# Patient Record
Sex: FEMALE | Race: WHITE | URBAN - METROPOLITAN AREA
[De-identification: names, ages, dates, MRNs, and addresses within clinical notes are randomized per-mention and may not be internally consistent; named-entity substitution may affect disease eponyms.]

---

## 2017-02-21 ENCOUNTER — TELEPHONE (OUTPATIENT)
Dept: INTERNAL MEDICINE CLINIC | Facility: CLINIC | Age: 72
End: 2017-02-21

## 2017-05-16 ENCOUNTER — PRIOR ORIGINAL RECORDS (OUTPATIENT)
Dept: OTHER | Age: 72
End: 2017-05-16

## 2017-06-13 ENCOUNTER — IMAGING SERVICES (OUTPATIENT)
Dept: OTHER | Age: 72
End: 2017-06-13

## 2017-06-13 ENCOUNTER — HOSPITAL (OUTPATIENT)
Dept: OTHER | Age: 72
End: 2017-06-13
Attending: OBSTETRICS & GYNECOLOGY

## 2017-06-20 ENCOUNTER — MYAURORA ACCOUNT LINK (OUTPATIENT)
Dept: OTHER | Age: 72
End: 2017-06-20

## 2017-06-20 ENCOUNTER — PRIOR ORIGINAL RECORDS (OUTPATIENT)
Dept: OTHER | Age: 72
End: 2017-06-20

## 2018-01-15 ENCOUNTER — ANTI-COAG VISIT (OUTPATIENT)
Dept: INTERNAL MEDICINE CLINIC | Facility: CLINIC | Age: 73
End: 2018-01-15

## 2018-01-15 DIAGNOSIS — I87.2 VENOUS (PERIPHERAL) INSUFFICIENCY: Primary | ICD-10-CM

## 2018-09-04 PROCEDURE — 87086 URINE CULTURE/COLONY COUNT: CPT | Performed by: EMERGENCY MEDICINE

## 2019-02-28 VITALS
WEIGHT: 150 LBS | BODY MASS INDEX: 22.73 KG/M2 | HEART RATE: 64 BPM | DIASTOLIC BLOOD PRESSURE: 70 MMHG | SYSTOLIC BLOOD PRESSURE: 132 MMHG | HEIGHT: 68 IN | RESPIRATION RATE: 16 BRPM

## 2019-03-01 VITALS
RESPIRATION RATE: 16 BRPM | DIASTOLIC BLOOD PRESSURE: 60 MMHG | HEART RATE: 80 BPM | WEIGHT: 151 LBS | HEIGHT: 68 IN | BODY MASS INDEX: 22.88 KG/M2 | SYSTOLIC BLOOD PRESSURE: 120 MMHG

## 2020-06-24 PROBLEM — F13.20 BENZODIAZEPINE DEPENDENCE (HCC): Status: ACTIVE | Noted: 2020-06-24

## 2020-06-24 PROBLEM — I70.0 AORTIC ATHEROSCLEROSIS: Status: ACTIVE | Noted: 2020-06-24

## 2020-06-24 PROBLEM — I70.0 AORTIC ATHEROSCLEROSIS (HCC): Status: ACTIVE | Noted: 2020-06-24

## 2020-11-12 PROCEDURE — 88305 TISSUE EXAM BY PATHOLOGIST: CPT | Performed by: RADIOLOGY

## 2021-01-21 PROBLEM — F13.20 BENZODIAZEPINE DEPENDENCE (HCC): Status: RESOLVED | Noted: 2020-06-24 | Resolved: 2021-01-21

## 2023-12-18 ENCOUNTER — HOSPITAL ENCOUNTER (EMERGENCY)
Facility: HOSPITAL | Age: 78
Discharge: HOME | End: 2023-12-18
Attending: EMERGENCY MEDICINE
Payer: MEDICARE

## 2023-12-18 ENCOUNTER — APPOINTMENT (OUTPATIENT)
Dept: ULTRASOUND IMAGING | Facility: HOSPITAL | Age: 78
End: 2023-12-18
Attending: EMERGENCY MEDICINE
Payer: MEDICARE

## 2023-12-18 VITALS
OXYGEN SATURATION: 100 % | HEIGHT: 66.5 IN | RESPIRATION RATE: 20 BRPM | WEIGHT: 150 LBS | BODY MASS INDEX: 23.82 KG/M2 | TEMPERATURE: 98 F | HEART RATE: 97 BPM | SYSTOLIC BLOOD PRESSURE: 156 MMHG | DIASTOLIC BLOOD PRESSURE: 81 MMHG

## 2023-12-18 DIAGNOSIS — N93.9 ABNORMAL VAGINAL BLEEDING: Primary | ICD-10-CM

## 2023-12-18 LAB
ALBUMIN SERPL-MCNC: 4 G/DL (ref 3.4–5)
ALBUMIN/GLOB SERPL: 1 {RATIO} (ref 1–2)
ALP LIVER SERPL-CCNC: 88 U/L
ALT SERPL-CCNC: 21 U/L
ANION GAP SERPL CALC-SCNC: 4 MMOL/L (ref 0–18)
AST SERPL-CCNC: 17 U/L (ref 15–37)
BASOPHILS # BLD AUTO: 0.04 X10(3) UL (ref 0–0.2)
BASOPHILS NFR BLD AUTO: 0.4 %
BILIRUB SERPL-MCNC: 0.4 MG/DL (ref 0.1–2)
BILIRUB UR QL STRIP.AUTO: NEGATIVE
BUN BLD-MCNC: 31 MG/DL (ref 9–23)
CALCIUM BLD-MCNC: 9.6 MG/DL (ref 8.5–10.1)
CHLORIDE SERPL-SCNC: 107 MMOL/L (ref 98–112)
CLARITY UR REFRACT.AUTO: CLEAR
CO2 SERPL-SCNC: 28 MMOL/L (ref 21–32)
CREAT BLD-MCNC: 0.89 MG/DL
EGFRCR SERPLBLD CKD-EPI 2021: 67 ML/MIN/1.73M2 (ref 60–?)
EOSINOPHIL # BLD AUTO: 0.16 X10(3) UL (ref 0–0.7)
EOSINOPHIL NFR BLD AUTO: 1.7 %
ERYTHROCYTE [DISTWIDTH] IN BLOOD BY AUTOMATED COUNT: 12.5 %
GLOBULIN PLAS-MCNC: 4 G/DL (ref 2.8–4.4)
GLUCOSE BLD-MCNC: 114 MG/DL (ref 70–99)
GLUCOSE UR STRIP.AUTO-MCNC: NORMAL MG/DL
HCT VFR BLD AUTO: 39.2 %
HGB BLD-MCNC: 13.4 G/DL
IMM GRANULOCYTES # BLD AUTO: 0.02 X10(3) UL (ref 0–1)
IMM GRANULOCYTES NFR BLD: 0.2 %
KETONES UR STRIP.AUTO-MCNC: NEGATIVE MG/DL
LEUKOCYTE ESTERASE UR QL STRIP.AUTO: NEGATIVE
LYMPHOCYTES # BLD AUTO: 1.67 X10(3) UL (ref 1–4)
LYMPHOCYTES NFR BLD AUTO: 17.8 %
MCH RBC QN AUTO: 32.1 PG (ref 26–34)
MCHC RBC AUTO-ENTMCNC: 34.2 G/DL (ref 31–37)
MCV RBC AUTO: 93.8 FL
MONOCYTES # BLD AUTO: 1.11 X10(3) UL (ref 0.1–1)
MONOCYTES NFR BLD AUTO: 11.8 %
NEUTROPHILS # BLD AUTO: 6.4 X10 (3) UL (ref 1.5–7.7)
NEUTROPHILS # BLD AUTO: 6.4 X10(3) UL (ref 1.5–7.7)
NEUTROPHILS NFR BLD AUTO: 68.1 %
NITRITE UR QL STRIP.AUTO: NEGATIVE
OSMOLALITY SERPL CALC.SUM OF ELEC: 295 MOSM/KG (ref 275–295)
PH UR STRIP.AUTO: 6.5 [PH] (ref 5–8)
PLATELET # BLD AUTO: 281 10(3)UL (ref 150–450)
POTASSIUM SERPL-SCNC: 3.6 MMOL/L (ref 3.5–5.1)
PROT SERPL-MCNC: 8 G/DL (ref 6.4–8.2)
PROT UR STRIP.AUTO-MCNC: NEGATIVE MG/DL
RBC # BLD AUTO: 4.18 X10(6)UL
RBC #/AREA URNS AUTO: >10 /HPF
SODIUM SERPL-SCNC: 139 MMOL/L (ref 136–145)
SP GR UR STRIP.AUTO: 1.02 (ref 1–1.03)
UROBILINOGEN UR STRIP.AUTO-MCNC: NORMAL MG/DL
WBC # BLD AUTO: 9.4 X10(3) UL (ref 4–11)

## 2023-12-18 PROCEDURE — 36415 COLL VENOUS BLD VENIPUNCTURE: CPT

## 2023-12-18 PROCEDURE — 85025 COMPLETE CBC W/AUTO DIFF WBC: CPT | Performed by: EMERGENCY MEDICINE

## 2023-12-18 PROCEDURE — 99284 EMERGENCY DEPT VISIT MOD MDM: CPT

## 2023-12-18 PROCEDURE — 99285 EMERGENCY DEPT VISIT HI MDM: CPT

## 2023-12-18 PROCEDURE — 81001 URINALYSIS AUTO W/SCOPE: CPT | Performed by: EMERGENCY MEDICINE

## 2023-12-18 PROCEDURE — 76856 US EXAM PELVIC COMPLETE: CPT | Performed by: EMERGENCY MEDICINE

## 2023-12-18 PROCEDURE — 80053 COMPREHEN METABOLIC PANEL: CPT | Performed by: EMERGENCY MEDICINE

## 2023-12-18 PROCEDURE — 93975 VASCULAR STUDY: CPT | Performed by: EMERGENCY MEDICINE

## 2023-12-18 NOTE — ED INITIAL ASSESSMENT (HPI)
Pt to ED for c/o vaginal bleeding sudden onset at 2215. Pt states it started with a brownish  \"gush\", now bright red blood while using the BR. Denies blood thinner use, nor urinary symptoms.

## 2024-01-09 RX ORDER — MULTIVIT-MIN/IRON FUM/FOLIC AC 7.5 MG-4
1 TABLET ORAL DAILY
COMMUNITY

## 2024-01-25 ENCOUNTER — ANESTHESIA EVENT (OUTPATIENT)
Dept: SURGERY | Facility: HOSPITAL | Age: 79
End: 2024-01-25
Payer: MEDICARE

## 2024-02-02 ENCOUNTER — ANESTHESIA (OUTPATIENT)
Dept: SURGERY | Facility: HOSPITAL | Age: 79
End: 2024-02-02
Payer: MEDICARE

## 2024-02-02 ENCOUNTER — HOSPITAL ENCOUNTER (OUTPATIENT)
Facility: HOSPITAL | Age: 79
Setting detail: HOSPITAL OUTPATIENT SURGERY
Discharge: HOME | End: 2024-02-02
Attending: OBSTETRICS & GYNECOLOGY | Admitting: OBSTETRICS & GYNECOLOGY
Payer: MEDICARE

## 2024-02-02 VITALS
TEMPERATURE: 98 F | HEART RATE: 93 BPM | DIASTOLIC BLOOD PRESSURE: 71 MMHG | OXYGEN SATURATION: 99 % | WEIGHT: 149 LBS | HEIGHT: 66.5 IN | RESPIRATION RATE: 16 BRPM | SYSTOLIC BLOOD PRESSURE: 142 MMHG | BODY MASS INDEX: 23.67 KG/M2

## 2024-02-02 PROBLEM — N95.0 POST-MENOPAUSAL BLEEDING: Status: ACTIVE | Noted: 2024-02-02

## 2024-02-02 PROCEDURE — 0UDB8ZX EXTRACTION OF ENDOMETRIUM, VIA NATURAL OR ARTIFICIAL OPENING ENDOSCOPIC, DIAGNOSTIC: ICD-10-PCS | Performed by: OBSTETRICS & GYNECOLOGY

## 2024-02-02 PROCEDURE — 88342 IMHCHEM/IMCYTCHM 1ST ANTB: CPT | Performed by: OBSTETRICS & GYNECOLOGY

## 2024-02-02 PROCEDURE — 88305 TISSUE EXAM BY PATHOLOGIST: CPT | Performed by: OBSTETRICS & GYNECOLOGY

## 2024-02-02 RX ORDER — METOCLOPRAMIDE HYDROCHLORIDE 5 MG/ML
INJECTION INTRAMUSCULAR; INTRAVENOUS AS NEEDED
Status: DISCONTINUED | OUTPATIENT
Start: 2024-02-02 | End: 2024-02-02 | Stop reason: SURG

## 2024-02-02 RX ORDER — NAPROXEN 500 MG/1
500 TABLET ORAL 3 TIMES DAILY
COMMUNITY

## 2024-02-02 RX ORDER — ONDANSETRON 2 MG/ML
4 INJECTION INTRAMUSCULAR; INTRAVENOUS EVERY 6 HOURS PRN
Status: DISCONTINUED | OUTPATIENT
Start: 2024-02-02 | End: 2024-02-02

## 2024-02-02 RX ORDER — HYDROMORPHONE HYDROCHLORIDE 1 MG/ML
0.4 INJECTION, SOLUTION INTRAMUSCULAR; INTRAVENOUS; SUBCUTANEOUS EVERY 5 MIN PRN
Status: DISCONTINUED | OUTPATIENT
Start: 2024-02-02 | End: 2024-02-02

## 2024-02-02 RX ORDER — GLYCOPYRROLATE 0.2 MG/ML
INJECTION, SOLUTION INTRAMUSCULAR; INTRAVENOUS AS NEEDED
Status: DISCONTINUED | OUTPATIENT
Start: 2024-02-02 | End: 2024-02-02 | Stop reason: SURG

## 2024-02-02 RX ORDER — HYDROCODONE BITARTRATE AND ACETAMINOPHEN 5; 325 MG/1; MG/1
2 TABLET ORAL ONCE AS NEEDED
Status: DISCONTINUED | OUTPATIENT
Start: 2024-02-02 | End: 2024-02-02

## 2024-02-02 RX ORDER — NALOXONE HYDROCHLORIDE 0.4 MG/ML
80 INJECTION, SOLUTION INTRAMUSCULAR; INTRAVENOUS; SUBCUTANEOUS AS NEEDED
Status: DISCONTINUED | OUTPATIENT
Start: 2024-02-02 | End: 2024-02-02

## 2024-02-02 RX ORDER — METOCLOPRAMIDE HYDROCHLORIDE 5 MG/ML
10 INJECTION INTRAMUSCULAR; INTRAVENOUS EVERY 8 HOURS PRN
Status: DISCONTINUED | OUTPATIENT
Start: 2024-02-02 | End: 2024-02-02

## 2024-02-02 RX ORDER — MEPERIDINE HYDROCHLORIDE 25 MG/ML
12.5 INJECTION INTRAMUSCULAR; INTRAVENOUS; SUBCUTANEOUS AS NEEDED
Status: DISCONTINUED | OUTPATIENT
Start: 2024-02-02 | End: 2024-02-02

## 2024-02-02 RX ORDER — SODIUM CHLORIDE, SODIUM LACTATE, POTASSIUM CHLORIDE, CALCIUM CHLORIDE 600; 310; 30; 20 MG/100ML; MG/100ML; MG/100ML; MG/100ML
INJECTION, SOLUTION INTRAVENOUS CONTINUOUS
Status: DISCONTINUED | OUTPATIENT
Start: 2024-02-02 | End: 2024-02-02

## 2024-02-02 RX ORDER — HYDROCODONE BITARTRATE AND ACETAMINOPHEN 5; 325 MG/1; MG/1
1 TABLET ORAL ONCE AS NEEDED
Status: DISCONTINUED | OUTPATIENT
Start: 2024-02-02 | End: 2024-02-02

## 2024-02-02 RX ORDER — DIPHENHYDRAMINE HYDROCHLORIDE 50 MG/ML
12.5 INJECTION INTRAMUSCULAR; INTRAVENOUS AS NEEDED
Status: DISCONTINUED | OUTPATIENT
Start: 2024-02-02 | End: 2024-02-02

## 2024-02-02 RX ORDER — ACETAMINOPHEN 500 MG
1000 TABLET ORAL ONCE AS NEEDED
Status: DISCONTINUED | OUTPATIENT
Start: 2024-02-02 | End: 2024-02-02

## 2024-02-02 RX ORDER — HYDROMORPHONE HYDROCHLORIDE 1 MG/ML
0.6 INJECTION, SOLUTION INTRAMUSCULAR; INTRAVENOUS; SUBCUTANEOUS EVERY 5 MIN PRN
Status: DISCONTINUED | OUTPATIENT
Start: 2024-02-02 | End: 2024-02-02

## 2024-02-02 RX ORDER — ACETAMINOPHEN 500 MG
1000 TABLET ORAL ONCE
Status: DISCONTINUED | OUTPATIENT
Start: 2024-02-02 | End: 2024-02-02

## 2024-02-02 RX ORDER — LABETALOL HYDROCHLORIDE 5 MG/ML
5 INJECTION, SOLUTION INTRAVENOUS EVERY 5 MIN PRN
Status: DISCONTINUED | OUTPATIENT
Start: 2024-02-02 | End: 2024-02-02

## 2024-02-02 RX ORDER — LIDOCAINE HYDROCHLORIDE 10 MG/ML
INJECTION, SOLUTION EPIDURAL; INFILTRATION; INTRACAUDAL; PERINEURAL AS NEEDED
Status: DISCONTINUED | OUTPATIENT
Start: 2024-02-02 | End: 2024-02-02 | Stop reason: SURG

## 2024-02-02 RX ORDER — HYDROMORPHONE HYDROCHLORIDE 1 MG/ML
0.2 INJECTION, SOLUTION INTRAMUSCULAR; INTRAVENOUS; SUBCUTANEOUS EVERY 5 MIN PRN
Status: DISCONTINUED | OUTPATIENT
Start: 2024-02-02 | End: 2024-02-02

## 2024-02-02 RX ORDER — PHENYLEPHRINE HCL 10 MG/ML
VIAL (ML) INJECTION AS NEEDED
Status: DISCONTINUED | OUTPATIENT
Start: 2024-02-02 | End: 2024-02-02 | Stop reason: SURG

## 2024-02-02 RX ORDER — ONDANSETRON 2 MG/ML
INJECTION INTRAMUSCULAR; INTRAVENOUS AS NEEDED
Status: DISCONTINUED | OUTPATIENT
Start: 2024-02-02 | End: 2024-02-02 | Stop reason: SURG

## 2024-02-02 RX ADMIN — ONDANSETRON 4 MG: 2 INJECTION INTRAMUSCULAR; INTRAVENOUS at 10:02:00

## 2024-02-02 RX ADMIN — METOCLOPRAMIDE HYDROCHLORIDE 10 MG: 5 INJECTION INTRAMUSCULAR; INTRAVENOUS at 10:02:00

## 2024-02-02 RX ADMIN — SODIUM CHLORIDE, SODIUM LACTATE, POTASSIUM CHLORIDE, CALCIUM CHLORIDE: 600; 310; 30; 20 INJECTION, SOLUTION INTRAVENOUS at 10:02:00

## 2024-02-02 RX ADMIN — PHENYLEPHRINE HCL 50 MCG: 10 MG/ML VIAL (ML) INJECTION at 10:25:00

## 2024-02-02 RX ADMIN — LIDOCAINE HYDROCHLORIDE 75 MG: 10 INJECTION, SOLUTION EPIDURAL; INFILTRATION; INTRACAUDAL; PERINEURAL at 10:07:00

## 2024-02-02 RX ADMIN — GLYCOPYRROLATE 0.4 MG: 0.2 INJECTION, SOLUTION INTRAMUSCULAR; INTRAVENOUS at 10:25:00

## 2024-02-02 NOTE — DISCHARGE SUMMARY
[facility]  Discharge Summary    Jesica Live Patient Status:  Hospital Outpatient Surgery    1945 MRN [id number]   Location [facility] SURGERY Attending Sparkle De La Cruz MD   Hosp Day # 0 PCP Lupillo Fragoso MD     Date of Admission: 2024    Date of Discharge: 2024    Admitting Diagnosis: POSTMENOPAUSAL BLEEDING, THICKENED ENDOMETRIUM    Discharge Diagnosis:   Patient Active Problem List   Diagnosis    Venous (peripheral) insufficiency    HTN (hypertension)    Aortic atherosclerosis (HCC)    Post-menopausal bleeding       Reason for Admission: Postmenopausal bleeding            Hospital Course: unremarkable        Procedures: HSC with D&C    Complications: none    Disposition: Home or Self Care    Discharge Condition: Stable  Diet:  General  Activity:  As tolerated    Discharge Medications:   Current Discharge Medication List        CONTINUE these medications which have NOT CHANGED    Details   naproxen 500 MG Oral Tab Take 1 tablet (500 mg total) by mouth in the morning and 1 tablet (500 mg total) at noon and 1 tablet (500 mg total) in the evening.      Multiple Vitamins-Minerals (MULTI-VITAMIN/MINERALS) Oral Tab Take 1 tablet by mouth daily.      LORazepam 0.5 MG Oral Tab Take 1 tablet (0.5 mg total) by mouth every 6 (six) hours as needed for Anxiety.  Qty: 90 tablet, Refills: 1      Lisinopril-hydroCHLOROthiazide 20-12.5 MG Oral Tab Two daily  Qty: 180 tablet, Refills: 1    Associated Diagnoses: Essential hypertension, benign                         Sparkle De La Cruz MD  2024  10:37 AM

## 2024-02-02 NOTE — ANESTHESIA PREPROCEDURE EVALUATION
PRE-OP EVALUATION    Patient Name: Jesica Live    Admit Diagnosis: POSTMENOPAUSAL BLEEDING, THICKENED ENDOMETRIUM    Pre-op Diagnosis: POSTMENOPAUSAL BLEEDING, THICKENED ENDOMETRIUM    HYSTEROSCOPY DILATION AND CURETTAGE    Anesthesia Procedure: HYSTEROSCOPY DILATION AND CURETTAGE (Uterus)    Surgeon(s) and Role:     * Sparkle De La Cruz MD - Primary    Pre-op vitals reviewed.  Temp: 98.6 °F (37 °C)  Pulse: 92  Resp: 18  BP: 167/80  SpO2: 96 %  Body mass index is 23.69 kg/m².    Current medications reviewed.  Hospital Medications:   [MAR Hold] acetaminophen (Tylenol Extra Strength) tab 1,000 mg  1,000 mg Oral Once    lactated ringers infusion   Intravenous Continuous       Outpatient Medications:     Medications Prior to Admission   Medication Sig Dispense Refill Last Dose    naproxen 500 MG Oral Tab Take 1 tablet (500 mg total) by mouth in the morning and 1 tablet (500 mg total) at noon and 1 tablet (500 mg total) in the evening.   Past Month    Multiple Vitamins-Minerals (MULTI-VITAMIN/MINERALS) Oral Tab Take 1 tablet by mouth daily.   Past Month    LORazepam 0.5 MG Oral Tab Take 1 tablet (0.5 mg total) by mouth every 6 (six) hours as needed for Anxiety. 90 tablet 1 2/1/2024    Lisinopril-hydroCHLOROthiazide 20-12.5 MG Oral Tab Two daily 180 tablet 1 2/2/2024       Allergies: Aspirin, Iodine (topical), Pcns [penicillins], and Shellfish      Anesthesia Evaluation    Patient summary reviewed.    Anesthetic Complications  (-) history of anesthetic complications         GI/Hepatic/Renal    Negative GI/hepatic/renal ROS.  (-) GERD                           Cardiovascular      ECG reviewed.  Exercise tolerance: good     MET: >4      (+) hypertension       (-) past MI                (-) angina     (-) PRABHAKAR  (-) orthopnea  (-) PND     Endo/Other    Negative endo/other ROS.                              Pulmonary    Negative pulmonary ROS.           (-) shortness of breath  (-) recent URI          Neuro/Psych    Negative  neuro/psych ROS.                                  Past Surgical History:   Procedure Laterality Date    CHOLECYSTECTOMY  07/18/2015    Laparoscopic by Dr. Feldman    D & C  1974    LUMPECTOMY RIGHT  2012    x3 so the same area    NEEDLE BIOPSY LEFT      RADIATION RIGHT  2012     Social History     Socioeconomic History    Marital status:    Tobacco Use    Smoking status: Former    Smokeless tobacco: Never    Tobacco comments:     40 yrs ago   Vaping Use    Vaping Use: Never used   Substance and Sexual Activity    Alcohol use: No    Drug use: No    Sexual activity: Not Currently     History   Drug Use No     Available pre-op labs reviewed.  Lab Results   Component Value Date    WBC 9.4 12/18/2023    RBC 4.18 12/18/2023    HGB 13.4 12/18/2023    HCT 39.2 12/18/2023    MCV 93.8 12/18/2023    MCH 32.1 12/18/2023    MCHC 34.2 12/18/2023    RDW 12.5 12/18/2023    .0 12/18/2023     Lab Results   Component Value Date     12/18/2023    K 3.6 12/18/2023     12/18/2023    CO2 28.0 12/18/2023    BUN 31 (H) 12/18/2023    CREATSERUM 0.89 12/18/2023     (H) 12/18/2023    CA 9.6 12/18/2023            Airway      Mallampati: I  Mouth opening: 3 FB  TM distance: 4 - 6 cm  Neck ROM: full Cardiovascular    Cardiovascular exam normal.  Rhythm: regular  Rate: normal  (-) murmur   Dental    Dentition appears grossly intact         Pulmonary    Pulmonary exam normal.  Breath sounds clear to auscultation bilaterally.        (-) wheezes       Other findings              ASA: 2   Plan: general  NPO status verified and patient meets guidelines.  Patient has not taken beta blockers in last 24 hours.        Plan/risks discussed with: patient              We discussed GA w/LMA or ETT and possible scratchy throat and rarely dental damage.  We discussed analgesic plan and PONV prophylaxis.  The patient's questions were answered and consent was attained.

## 2024-02-02 NOTE — DISCHARGE INSTRUCTIONS
Hysteroscopy and Dilatation and Curettage  Endometrial Ablation    Ander Partida, Shay, Ragini, Johnny Obrien,  Francis, Jono, Susana, Weeks    After surgery you may have some light vaginal bleeding. This may last up to a week and is not a concern unless it is heavier than a menstrual period.     You should avoid tampons for the first week after surgery. Also no intercourse for one week after a D and C.    You may experience cramping the day/evening of surgery. This is usually relieved with over the counter Acetaminophen (Tylenol), Ibuprofen (Motrin or Advil) or Naprosyn (Aleve). A prescription pain medicine may be ordered by your physician. You may also be given a medication for possible nausea.    You should rest the day of surgery. No driving for 24 hours after general anesthesia or sedation or if you are taking prescription pain medications. You may resume normal activities the next day. Showering is fine the day after surgery. Exercise is fine the next day if you are comfortable doing it.    You may resume your normal diet once your appetite returns after surgery.  Some patients will experience nausea from anesthesia or narcotics: we recommend you start with a light diet. Do not drink alcohol or use other sedating medications (sleep aids, sedating cold medications) if taking prescription pain medications. Resume your normal medications as instructed.    Please call our office if you experience any of the following symptoms:  Temperature over 100.5 degrees  Vaginal bleeding heavier than a moderate period  Severe abdominal/pelvic pain  Shortness of breath or chest pain  New onset of leg pain and/or swelling    If a specimen was sent to pathology, you can expect a call from your doctor within 10 days with the report.   If your doctor requested a post op appointment, please call to schedule an appointment for 2 weeks post op.    Please call with any other questions or concerns.    Office Number:  [phone]

## 2024-02-02 NOTE — OPERATIVE REPORT
OPERATIVE REPORT    Date of surgery:  2024    Pre op Diagnosis:  Postmenopausal bleeding with thickened endometrium    Post op Diagnosis: same    Procedure: Hysteroscopy with D&C    Surgeon: Jono    Assistant: none    Anesthesia: gen    EBL:10    Specimen:  endometrial curettings      History: Patient is a 78 year old female,  1,with history of postmenopausal bleeding and thickened endometrium. Preop evaluation has included  US and EmBx. She presents now for hysteroscopy and D and C.    Findings: Exam under anesthesia revealed AV uterus. Adnexa were not palpated. Under the hysteroscope  abundant tissue.  No submucosal fibroids or polyps were seen. Bilateral ostii were not visualized.     Procedure: The patient was prepped and draped in the usual sterile fashion after satisfactory anesthesia was obtained. The bladder was catheterized yielding 150 cc of clear prashant urine. The patient was examined with the findings as noted above. A weighted speculum was placed in the vagina and the anterior lip of the cervix was grasped with a single tooth tenaculum and brought forward. With continuous saline infusion, the cervix was hydrodilated as a 5 mm hysteroscope was inserted.  Hysteroscopy was performed with continuous saline infusion with the findings as noted above.  Endometrial tissue was resected through the hysteroscope with Truclear device.    The hysteroscope was removed and the cervix was further dilated up to 8 mm. Sharp endometrial curettage was performed yielding  small amount of tissue.  Specimen was sent to pathology.  There was no evidence of uterine perforation. Instruments were removed from the vagina. The tenaculum marks were hemostatic. Patient was awakened and taken to the recovery room in good condition.

## 2024-02-02 NOTE — ANESTHESIA POSTPROCEDURE EVALUATION
[facility]    Jesica Live Patient Status:  Hospital Outpatient Surgery   Age/Gender 78 year old female MRN [id number]   Location [facility] SURGERY Attending Sparkle De La Cruz MD   Hosp Day # 0 PCP Lupillo Fragoso MD       Anesthesia Post-op Note    HYSTEROSCOPY DILATION AND CURETTAGE    Procedure Summary       Date: 02/02/24 Room / Location:  MAIN OR 19 / [facility] MAIN OR    Anesthesia Start: 1002 Anesthesia Stop:     Procedure: HYSTEROSCOPY DILATION AND CURETTAGE (Uterus) Diagnosis: (POSTMENOPAUSAL BLEEDING, THICKENED ENDOMETRIUM)    Surgeons: Sparkle De La Cruz MD Anesthesiologist: Timmy Bustillos MD    Anesthesia Type: general ASA Status: 2            Anesthesia Type: general    Vitals Value Taken Time   /72 02/02/24 1050   Temp 98.2 °F (36.8 °C) 02/02/24 1040   Pulse 94 02/02/24 1052   Resp 16 02/02/24 1040   SpO2 99 % 02/02/24 1052   Vitals shown include unfiled device data.    Patient Location: Same Day Surgery    Anesthesia Type: general    Airway Patency: patent    Postop Pain Control: adequate    Mental Status: preanesthetic baseline    Nausea/Vomiting: none    Cardiopulmonary/Hydration status: stable euvolemic    Complications: no apparent anesthesia related complications    Postop vital signs: stable    Dental Exam: Unchanged from Preop    Patient to be discharged home when criteria met.

## 2024-02-02 NOTE — ANESTHESIA PROCEDURE NOTES
Airway  Date/Time: 2/2/2024 10:07 AM  Urgency: elective      General Information and Staff    Patient location during procedure: OR  Anesthesiologist: Timmy Bustillos MD  Performed: anesthesiologist   Performed by: Timmy Bustillos MD  Authorized by: Timmy Bustillos MD      Indications and Patient Condition  Indications for airway management: anesthesia  Spontaneous Ventilation: absent  Sedation level: deep  Preoxygenated: yes  Patient position: sniffing  Mask difficulty assessment: 1 - vent by mask    Final Airway Details  Final airway type: supraglottic airway      Successful airway: classic  Size 3       Number of attempts at approach: 1  Number of other approaches attempted: 0

## 2024-02-02 NOTE — H&P
SUBJECTIVE:  Reason for Admission: Post menopausal bleeding  with thickened endometrial lining    History of Present Illness: Patient is a 78 year old female   patient  for years, no HRT for 10 + years.  Recently had some vaginal bleeding and US shows lining 3 cm and irregular.  EmBx with polyp and benign tissue    Remote h/o breast CA and Tamoxifen, not currently taking        Allergies   Allergen Reactions    Aspirin HIVES    Iodine (Topical) HIVES    Pcns [Penicillins] HIVES    Shellfish HIVES        Past Medical History:   Diagnosis Date    Anxiety     Breast cancer (HCC)     right breast DCIS per pt.     Exposure to medical diagnostic radiation     Last treatment in     High blood pressure     HTN (hypertension) 2016    osteoarthritis     in hip       Past Surgical History   Past Surgical History:   Procedure Laterality Date    CHOLECYSTECTOMY  2015    Laparoscopic by Dr. Feldman    D & C  1974    LUMPECTOMY RIGHT  2012    x3 so the same area    NEEDLE BIOPSY LEFT      RADIATION RIGHT         Past OB History  OB History    Para Term  AB Living   1 1 0 1   1   SAB IAB Ectopic Multiple Live Births           1      # Outcome Date GA Lbr Hardy/2nd Weight Sex Delivery Anes PTL Lv   1  07/15/71 36w0d  8 lb 6 oz (3.799 kg) M NORMAL SPONT   MARIA DEL CARMEN       Past GYN History  as above       Social History  Social History     Tobacco Use    Smoking status: Former    Smokeless tobacco: Never    Tobacco comments:     40 yrs ago   Substance Use Topics    Alcohol use: No        Review of Systems  Pertinent items are noted in HPI.    OBJECTIVE:  Patient Vitals for the past 24 hrs:   BP Temp Temp src Pulse Resp SpO2 Weight   24 0805 (!) 167/80 98.6 °F (37 °C) Temporal 92 18 96 % 149 lb (67.6 kg)     No intake or output data in the 24 hours ending 24 0952    BP (!) 167/80 (BP Location: Left arm)   Pulse 92   Temp 98.6 °F (37 °C) (Temporal)   Resp 18   Ht 5' 6.5\" (1.689 m)    Wt 149 lb (67.6 kg)   SpO2 96%   BMI 23.69 kg/m²   General appearance: alert, appears stated age, and cooperative  Lungs: clear to auscultation bilaterally  Heart: S1, S2 normal  Extremities: extremities normal, atraumatic, no cyanosis or edema    Diagnostics: US endometrial lining 3.2 cm    Data Review: EmBx polyp and benign tissue    ASSESSMENT:   bleeding with thickened endometrium h/o Tamoxifen use    PLAN:  For HSC with D&C.  Procedure discussed with patient with risks and she consents without further questions today.    Sparkle De La Cruz MD, FACOG  OB/GYN [facility]

## 2024-02-06 ENCOUNTER — TELEPHONE (OUTPATIENT)
Dept: HEMATOLOGY/ONCOLOGY | Facility: HOSPITAL | Age: 79
End: 2024-02-06

## 2024-02-21 ENCOUNTER — OFFICE VISIT (OUTPATIENT)
Dept: HEMATOLOGY/ONCOLOGY | Facility: HOSPITAL | Age: 79
End: 2024-02-21
Attending: OBSTETRICS & GYNECOLOGY
Payer: MEDICARE

## 2024-02-21 VITALS
WEIGHT: 152 LBS | OXYGEN SATURATION: 94 % | TEMPERATURE: 98 F | SYSTOLIC BLOOD PRESSURE: 160 MMHG | DIASTOLIC BLOOD PRESSURE: 79 MMHG | BODY MASS INDEX: 24 KG/M2 | RESPIRATION RATE: 20 BRPM | HEART RATE: 86 BPM

## 2024-02-21 PROBLEM — C54.1 ENDOMETRIAL CANCER (HCC): Status: ACTIVE | Noted: 2024-02-21

## 2024-02-21 PROCEDURE — 99211 OFF/OP EST MAY X REQ PHY/QHP: CPT

## 2024-02-21 RX ORDER — ACETAMINOPHEN 500 MG
500 TABLET ORAL EVERY 6 HOURS PRN
COMMUNITY

## 2024-02-21 NOTE — CONSULTS
INITIAL GYNECOLOGY ONCOLOGY EVALUATION        Patient: Jesica Live  MR#: [id number]  Date: 2/21/2024     Referring Physician:  Jono     CHIEF COMPLAINT:  G1 endometrial cancer     HISTORY OF PRESENT ILLNESS:    Jesica Live is a 78 year old female who presented with postmenopausal bleeding and thickened endometrial stripe.  Hysteroscopy revealed G1 endometrial cancer.     PAST MEDICAL HISTORY:       Past Medical History:   Diagnosis Date    Anxiety      Breast cancer (HCC) 2012     right breast DCIS per pt.     Exposure to medical diagnostic radiation       Last treatment in 2012    High blood pressure      HTN (hypertension) 11/14/2016    osteoarthritis       in hip         PAST SURGICAL HISTORY:        Past Surgical History:   Procedure Laterality Date    CHOLECYSTECTOMY   07/18/2015     Laparoscopic by Dr. Feldman    D & C   1974    LUMPECTOMY RIGHT   2012     x3 so the same area    NEEDLE BIOPSY LEFT        RADIATION RIGHT   2012         MEDICATIONS:         Current Outpatient Medications   Medication Sig Dispense Refill             Multiple Vitamins-Minerals (MULTI-VITAMIN/MINERALS) Oral Tab Take 1 tablet by mouth daily.        LORazepam 0.5 MG Oral Tab Take 1 tablet (0.5 mg total) by mouth every 6 (six) hours as needed for Anxiety. 90 tablet 1    Lisinopril-hydroCHLOROthiazide 20-12.5 MG Oral Tab Two daily 180 tablet 1         ALLERGIES:  Aspirin, Iodine (topical), Pcns [penicillins], and Shellfish     FAMILY HISTORY:        Family History   Problem Relation Age of Onset    Diabetes Mother           TYPE II    Hypertension Mother           SOCIAL HISTORY:  Social History            Socioeconomic History    Marital status:    Tobacco Use    Smoking status: Former    Smokeless tobacco: Never    Tobacco comments:       40 yrs ago   Vaping Use    Vaping Use: Never used   Substance and Sexual Activity    Alcohol use: No    Drug use: No    Sexual activity: Not Currently           ROS:  10 systems  reviewed and negative unless otherwise stated in HPI     Gynecologic History:      P:  1  LMP:  No LMP recorded.     PHYSICAL EXAMINATION:  There were no vitals taken for this visit.   GENERAL : in no apparent distress and well developed and well nourished  LYMPH: No cervical, inguinal, axillary, or supraclavicular adenopathy.    HEENT: Negative  NECK: No adenopathy, no thyroimegaly  ABDOMEN: No masses or tendeness  EXTREMITIES: No edema  NEUROLOGIC: Negative  Pelvic: exam chaperoned by nurse,EGBUS within normal limits,normal cervix without lesions, polyps or tenderness,uterus normal size, shape, consistency, no mass or tenderness,adnexa normal in size without mass or tenderness       DATABASE:  TVS reviewed      ASSESSMENT:  Jesica Live is a 78 year old female with Clinical stage I grade 1 endometrial carcinoma     PLAN:  Do feel she is a good candidate for robotic assisted procedure with BSO and sentinel lymph nodes.  We did discuss risk benefits complications and alternatives. Her questions were answered.

## 2024-02-21 NOTE — PROGRESS NOTES
Patient is here for MD consult for post menopausal bleeding. Patient had a D&C on February 2nd. 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 Here to further discuss.       Education Record    Learner:  Patient and Spouse    Disease / Diagnosis:  consult     Barriers / Limitations:  None   Comments:    Method:  Discussion   Comments:    General Topics:  Plan of care reviewed   Comments:    Outcome:  Shows understanding   Comments:

## 2024-03-06 ENCOUNTER — NURSE ONLY (OUTPATIENT)
Dept: HEMATOLOGY/ONCOLOGY | Age: 79
End: 2024-03-06
Attending: OBSTETRICS & GYNECOLOGY
Payer: MEDICARE

## 2024-03-06 PROCEDURE — 99211 OFF/OP EST MAY X REQ PHY/QHP: CPT

## 2024-03-06 NOTE — PROGRESS NOTES
Patient arrived at the [facility] for Teran catheter removal as ordered by Dr Gore. In Teran bag was 100 ml of clear yellow urine. Patient denies pain. Catheter removed at 1145 am. Patient instructed to go to the ER if she doesn't urinate in the next 6-8 hours. Patient verbalized understanding.

## 2024-03-20 ENCOUNTER — OFFICE VISIT (OUTPATIENT)
Dept: HEMATOLOGY/ONCOLOGY | Facility: HOSPITAL | Age: 79
End: 2024-03-20
Attending: OBSTETRICS & GYNECOLOGY
Payer: MEDICARE

## 2024-03-20 VITALS
BODY MASS INDEX: 24 KG/M2 | SYSTOLIC BLOOD PRESSURE: 146 MMHG | DIASTOLIC BLOOD PRESSURE: 74 MMHG | RESPIRATION RATE: 16 BRPM | HEART RATE: 84 BPM | WEIGHT: 149 LBS | OXYGEN SATURATION: 95 % | TEMPERATURE: 98 F

## 2024-03-20 DIAGNOSIS — C54.1 ENDOMETRIAL CANCER (HCC): Primary | ICD-10-CM

## 2024-03-20 PROCEDURE — 99211 OFF/OP EST MAY X REQ PHY/QHP: CPT

## 2024-03-20 NOTE — PROGRESS NOTES
Patient is here for post op visit with Dr Gore. Patient is s/p RAH BSO on 3/4 completed at [place]. Patient reports mild brownish discharge and vaginal irritation. No urinary complaints. Denies pain.       Education Record    Learner:  Patient    Disease / Diagnosis:  post op/ endometrial cancer     Barriers / Limitations:  None   Comments:    Method:  Discussion   Comments:    General Topics:  Plan of care reviewed   Comments:    Outcome:  Shows understanding   Comments:

## 2024-03-20 NOTE — PROGRESS NOTES
Post Op visit  3/20/2024    Jesica Live returns today s/p robotic assisted TLH BSO and staging for a stage Ia G 2 endometrial cancer 3/2024 (0/0mm, negative LVSI). She presents without complaints for post operative exam.    Loss of MLH1 and Pms2  methylation pending    She was discharged with a Teran and is complaining of some irritation anterior probably from the Teran rubbing.  She also complaining of worsening vasomotor symptoms somewhat unusual for 78-year-old.    PHYSICAL EXAMINATION:  /74 (BP Location: Left arm, Patient Position: Sitting, Cuff Size: adult)   Pulse 84   Temp 97.6 °F (36.4 °C) (Temporal)   Resp 16   Wt 67.6 kg (149 lb)   SpO2 95%   BMI 23.69 kg/m²    On abdominal exam the incision is healing well. No discharge or errythema. Vaginal cuff is well healed. No pelvic masses or fluid collections.    Path reviewed with followup recommendations observation.    PLAN:  1.  RTC 4 months      carbon copy: Jono

## 2024-07-17 ENCOUNTER — OFFICE VISIT (OUTPATIENT)
Dept: HEMATOLOGY/ONCOLOGY | Facility: HOSPITAL | Age: 79
End: 2024-07-17
Attending: OBSTETRICS & GYNECOLOGY
Payer: MEDICARE

## 2024-07-17 VITALS
BODY MASS INDEX: 23.72 KG/M2 | SYSTOLIC BLOOD PRESSURE: 153 MMHG | HEIGHT: 66.5 IN | TEMPERATURE: 98 F | OXYGEN SATURATION: 95 % | DIASTOLIC BLOOD PRESSURE: 81 MMHG | HEART RATE: 85 BPM | RESPIRATION RATE: 14 BRPM | WEIGHT: 149.38 LBS

## 2024-07-17 DIAGNOSIS — R30.0 DYSURIA: Primary | ICD-10-CM

## 2024-07-17 DIAGNOSIS — R35.0 URINARY FREQUENCY: ICD-10-CM

## 2024-07-17 DIAGNOSIS — N93.9 VAGINAL BLEEDING: ICD-10-CM

## 2024-07-17 DIAGNOSIS — R31.9 HEMATURIA: ICD-10-CM

## 2024-07-17 DIAGNOSIS — C54.1 ENDOMETRIAL CANCER (HCC): ICD-10-CM

## 2024-07-17 LAB
BILIRUB UR QL STRIP.AUTO: NEGATIVE
CLARITY UR REFRACT.AUTO: CLEAR
COLOR UR AUTO: YELLOW
GLUCOSE UR STRIP.AUTO-MCNC: NEGATIVE MG/DL
KETONES UR STRIP.AUTO-MCNC: NEGATIVE MG/DL
NITRITE UR QL STRIP.AUTO: NEGATIVE
PH UR STRIP.AUTO: 5.5 [PH] (ref 5–8)
PROT UR STRIP.AUTO-MCNC: NEGATIVE MG/DL
RBC #/AREA URNS AUTO: >10 /HPF
RBC #/AREA URNS AUTO: >10 /HPF
SP GR UR STRIP.AUTO: >=1.03 (ref 1–1.03)
UROBILINOGEN UR STRIP.AUTO-MCNC: 0.2 MG/DL

## 2024-07-17 PROCEDURE — 87088 URINE BACTERIA CULTURE: CPT | Performed by: OBSTETRICS & GYNECOLOGY

## 2024-07-17 PROCEDURE — 99211 OFF/OP EST MAY X REQ PHY/QHP: CPT

## 2024-07-17 PROCEDURE — 87086 URINE CULTURE/COLONY COUNT: CPT | Performed by: OBSTETRICS & GYNECOLOGY

## 2024-07-17 PROCEDURE — 87186 SC STD MICRODIL/AGAR DIL: CPT | Performed by: OBSTETRICS & GYNECOLOGY

## 2024-07-17 PROCEDURE — 88305 TISSUE EXAM BY PATHOLOGIST: CPT | Performed by: OBSTETRICS & GYNECOLOGY

## 2024-07-17 PROCEDURE — 81001 URINALYSIS AUTO W/SCOPE: CPT | Performed by: OBSTETRICS & GYNECOLOGY

## 2024-07-17 PROCEDURE — 81015 MICROSCOPIC EXAM OF URINE: CPT | Performed by: OBSTETRICS & GYNECOLOGY

## 2024-07-17 NOTE — PROGRESS NOTES
Jesica Live returns today s/p robotic assisted TLH BSO and staging for a stage Ia G 2 endometrial cancer 3/2024 (0/0mm, negative LVSI). She presents for  exam.    Loss of MLH1 and Pms2  methylation pending    She is complaining of some bladder urgency some mild burning which is intermittent.  Also complained of some blood when wiping.    PHYSICAL EXAMINATION:  /81 (BP Location: Left arm, Patient Position: Sitting, Cuff Size: adult)   Pulse 85   Temp 97.7 °F (36.5 °C) (Temporal)   Resp 14   Ht 1.689 m (5' 6.5\")   Wt 67.8 kg (149 lb 6.4 oz)   SpO2 95%   BMI 23.76 kg/m²    On abdominal exam the incision is healing well. No discharge or errythema. Vaginal cuff is 2 small 3 mm granulation tissue.  We did biopsy these areas and applied some silver nitrate      No pelvic masses or fluid collections.    Inflammatory polyp versus small recurrence.  If it is the latter we did discuss need for imaging and then local radiation.      PLAN:  1.  RTC 6 months  Ua with reflex      carbon copy: Jono

## 2024-07-17 NOTE — PROGRESS NOTES
Patient is here for 4 month follow up for Endometrial cancer. Patient c/o intermittent bleeding after wiping since Monday. She c/o urinary frequency, burning and pressure. No fevers. Feeling well otherwise.     Education Record    Learner:  Patient    Disease / Diagnosis:  Endometrial cancer     Barriers / Limitations:  None   Comments:    Method:  Discussion   Comments:    General Topics:  Plan of care reviewed   Comments:    Outcome:  Shows understanding   Comments:

## 2024-07-22 RX ORDER — NITROFURANTOIN 25; 75 MG/1; MG/1
100 CAPSULE ORAL 2 TIMES DAILY
Qty: 14 CAPSULE | Refills: 0 | Status: SHIPPED | OUTPATIENT
Start: 2024-07-22 | End: 2024-07-29

## 2024-07-29 ENCOUNTER — TELEPHONE (OUTPATIENT)
Dept: HEMATOLOGY/ONCOLOGY | Facility: HOSPITAL | Age: 79
End: 2024-07-29

## 2024-07-29 DIAGNOSIS — C54.1 ENDOMETRIAL CANCER (HCC): Primary | ICD-10-CM

## 2024-07-29 NOTE — TELEPHONE ENCOUNTER
Per Dr Gore   \"Pap smear results never came to me in my inbox, strange I tried calling the patient I left her a voicemail and also gave her number to call back.  We have discussed this in the office.   She has very minimal recurrence of her endometrial cancer seems to only be in the vagina.  These she had 2 small little nodules that are both about 2 to 3 mm in size.  Apple if we could order a CAT scan on her and have her see radiation oncology for external RT.  There is additional disease that I cannot feel on exam probably just high-dose brachii to the vaginal cuff would be adequate.\"    Spoke with patient. Relayed above information. Patient verbalized understanding.

## 2024-07-30 ENCOUNTER — HOSPITAL ENCOUNTER (OUTPATIENT)
Dept: CT IMAGING | Facility: HOSPITAL | Age: 79
Discharge: HOME | End: 2024-07-30
Attending: OBSTETRICS & GYNECOLOGY
Payer: MEDICARE

## 2024-07-30 DIAGNOSIS — C54.1 ENDOMETRIAL CANCER (HCC): ICD-10-CM

## 2024-07-30 NOTE — IMAGING NOTE
RN was asked to manage a patient by TORRES Mooney. The patient was here for a stat CT scan with oral and IV contrast. The patient states that she had hives post IV iodinated CT contrast \"about 30 years ago\". The patient says that there were multiple hives but she doesn't remember where they were or even what the scan was for. Patient was rescheduled for 1430 tomorrow so that she can safely receive IV contrast with our 13 hour allergic reaction prep. RN called JOHN Bacon at Dr. Gore's office and gave her the 13 hour allergic reaction prep regimen which she will order to the patient's [facility] pharmacy in [facility] in [place]. RN also arranged for the patient to arrive one hour early for vascular access under ultrasound as the patient is a very difficult stick. The patient has her oral contrast and RN gave her written instructions to take the first bottle at 1300. The first half of the second bottle at 1400 and the second half of the second bottle at 1415. Patient verbalized an understanding with read back.     RN also called the patient at home and gave her the timing instructions for the Prednisone and Benedryl for her 13 hour prep.     50mg of Prednisone at 1:30 am  50mg of Prednisone at 7:30 am  50mg of Prednisone at 1330 pm  50mg of Benedryl at 1330 pm    RN informed the patient that she needs a  and she stated that her  will drive her.

## 2024-07-31 ENCOUNTER — HOSPITAL ENCOUNTER (OUTPATIENT)
Dept: CT IMAGING | Facility: HOSPITAL | Age: 79
Discharge: HOME | End: 2024-07-31
Attending: OBSTETRICS & GYNECOLOGY
Payer: MEDICARE

## 2024-07-31 LAB
CREAT BLD-MCNC: 0.8 MG/DL
EGFRCR SERPLBLD CKD-EPI 2021: 76 ML/MIN/1.73M2 (ref 60–?)

## 2024-07-31 PROCEDURE — 82565 ASSAY OF CREATININE: CPT

## 2024-07-31 PROCEDURE — 74177 CT ABD & PELVIS W/CONTRAST: CPT | Performed by: OBSTETRICS & GYNECOLOGY

## 2024-07-31 PROCEDURE — 71260 CT THORAX DX C+: CPT | Performed by: OBSTETRICS & GYNECOLOGY

## 2024-08-05 ENCOUNTER — HOSPITAL ENCOUNTER (OUTPATIENT)
Dept: RADIATION ONCOLOGY | Facility: HOSPITAL | Age: 79
Discharge: HOME | End: 2024-08-05
Attending: RADIOLOGY
Payer: MEDICARE

## 2024-08-05 VITALS — HEIGHT: 66 IN | BODY MASS INDEX: 23.72 KG/M2 | WEIGHT: 147.63 LBS

## 2024-08-05 DIAGNOSIS — C54.1 ENDOMETRIAL CANCER (HCC): Primary | ICD-10-CM

## 2024-08-05 PROCEDURE — 99214 OFFICE O/P EST MOD 30 MIN: CPT

## 2024-08-05 NOTE — PATIENT INSTRUCTIONS
- WE WILL CALL TO SCHEDULE YOUR CT SIMULATION FOR RADIATION PLANNING.     - PLEASE FOLLOW FULL BLADDER INSTRUCTIONS FOR YOUR CT SIMULATION AND RADIATION TREATMENTS.     - IF YOU HAVE ANY QUESTIONS OR CONCERNS REGARDING RADIATION THERAPY, PLEASE CALL [phone].

## 2024-08-05 NOTE — PROGRESS NOTES
Nursing Consultation Note  Patient: Jesica Live  YOB: 1945  Age: 79 year old  Radiation Oncologist: Dr. Brenton Gustafson  Referring Physician: Dwight Gore  Diagnosis: ENDOMETRIAL CA  Consult Date: 8/5/2024      Chemotherapy: N/A  Labs: Reviewed  Imaging: Reviewed  Is the patient of child-bearing age?         No  Menarche: 16 y/o  Menopause: 60 y/o  OCP use x 12 yrs  HRT x 10 yrs  Breastfeed; NO    Has the patient received radiation therapy in the past? yes, received R breast R in 2012    Does the patient have an implantable device?No   Patient has/has had:     1. Assistive Devices: N/A    2. Flu Vaccination: no-referral to ask PCP    3. Pneumonia Vaccination:  yes    Vital Signs: There were no vitals filed for this visit.,   Wt Readings from Last 6 Encounters:   08/05/24 67 kg (147 lb 9.6 oz)   07/17/24 67.8 kg (149 lb 6.4 oz)   03/20/24 67.6 kg (149 lb)   02/21/24 68.9 kg (152 lb)   02/02/24 67.6 kg (149 lb)   12/18/23 68 kg (150 lb)       Nursing Note: Pt diagnosed with endometrial ca; presented with post-menopausal bleeding. Had hysteroscopy on 2/2/24 which revealed grade 1 endometrial ca. Referred to Dr. Gore, Underwent RATAH-BSO and bilat SLN bx on 3/4/24. Path showed grade 2 endometrial adenoca, no myometrial invasion or LVI, 0/2 +SLN. Followed up with Dr. Gore on 7/17, noted 2 small granulated tissue on vag cuff. Areas biopsied, +malignancy noted. Ordered CT and referred for RT consult. Pt here alone, AOX4. Denies bowel or bladder problems. Denies vaginal discharge or pain.          Review of Systems   Constitutional: Negative.    HENT: Negative.     Eyes: Negative.    Respiratory: Negative.     Cardiovascular: Negative.    Gastrointestinal: Negative.    Endocrine: Negative.    Genitourinary: Negative.    Musculoskeletal: Negative.    Skin: Negative.    Allergic/Immunologic: Negative.    Neurological: Negative.    Psychiatric/Behavioral: Negative.            Allergies:  Allergies    Allergen Reactions    Aspirin HIVES    Iodine (Topical) HIVES    Pcns [Penicillins] HIVES    Shellfish HIVES       Current Outpatient Medications   Medication Sig Dispense Refill    Multiple Vitamins-Minerals (MULTI-VITAMIN/MINERALS) Oral Tab Take 1 tablet by mouth daily.      LORazepam 0.5 MG Oral Tab Take 1 tablet (0.5 mg total) by mouth every 6 (six) hours as needed for Anxiety. 90 tablet 1    Lisinopril-hydroCHLOROthiazide 20-12.5 MG Oral Tab Two daily 180 tablet 1    acetaminophen 500 MG Oral Tab Take 1 tablet (500 mg total) by mouth every 6 (six) hours as needed for Pain. (Patient not taking: Reported on 8/5/2024)         Preferred Pharmacy:    [facility] [address] [place] [facility] [address] [phone], [phone]  [address]  [place] [address]  Phone: [phone] Fax: [phone]    [facility] [address] [place] [facility] [address] [phone], [phone]  [address] [facility] [address]  Phone: [phone] Fax: [phone]    [facility] [address] [facility] [phone], [phone]  [address]  [facility] [address]  Phone: [phone] Fax: [phone]    [id number] [place] - [address] AT [facility] [address] [facility], [phone], [phone]  [address]  [place] [address]  Phone: [phone] Fax: [phone]    [facility] [address] [facility] [place] [facility] [address] [facility] [phone], [phone]  [address] [facility] [address]  Phone: [phone] Fax: [phone]      Past Medical History:    Anxiety    Breast cancer (HCC)    right breast DCIS per pt.     Exposure to medical diagnostic radiation    Last treatment in 2012    High blood pressure    HTN (hypertension)    Hx of dilation and curettage    osteoarthritis    in hip    Uterine cancer (HCC)       Past Surgical History:   Procedure Laterality Date    Cholecystectomy  07/18/2015    Laparoscopic by Dr. Feldman    D & c  1974    Hysterectomy   03/04/2024    [facility] BSO at [place]    Lumpectomy right  2012    x3 so the same area    Needle biopsy left      Radiation right  2012       Social History     Socioeconomic History    Marital status:      Spouse name: Not on file    Number of children: 1    Years of education: Not on file    Highest education level: Not on file   Occupational History    Occupation: works at [employer]   Tobacco Use    Smoking status: Former    Smokeless tobacco: Never    Tobacco comments:     Quit 50 yrs ago   Vaping Use    Vaping status: Never Used   Substance and Sexual Activity    Alcohol use: No    Drug use: No    Sexual activity: Not Currently     Partners: Male     Birth control/protection: Hysterectomy   Other Topics Concern    Caffeine Concern Not Asked    Exercise Not Asked    Seat Belt Not Asked    Special Diet Not Asked    Stress Concern Not Asked    Weight Concern Not Asked   Social History Narrative    , lives with     Has 1 son living 2 hours away    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      Education: YES  Knowledge Deficit Plan Of Care:    Problem:  Knowledge Deficit    Problems related to:    Radiation therapy    Interventions:  Instruct on purpose of radiation therapy    Expected Outcomes:  Knowledge of radiation therapy    Progress Toward Outcome:  Making progress    Pamphlets/Handouts Given to Patient:  Understanding radiation therapy      Are ADL's met?  Yes  Does patient feel safe in their environment?  Yes  Care decisions:  Patient and/or surrogate IS involved in care decisions.  Advanced directives:  Patient HAS advnaced  directives and a copy has been requested.  Transportation:  Adequate transportation available for expected visits    Pain:   ;Pain Score: 0   ;    ;

## 2024-08-05 NOTE — CONSULTS
[facility]  RADIATION ONCOLOGY  CONSULTATION     PATIENT:   Jesica JANG Maria T      REFERRING MD:  Dwight Gore MD  DIAGNOSIS:   Recurrent endometrial cancer    CC:    Discuss salvage radiation therapy    HPI   79 year old here with for consultation.    She presented with vaginal bleeding.  She saw Dr. De La Cruz.  Endometrial curettage on 2/2/2024 showed FIGO grade 1 endometrioid adenocarcinoma.      She underwent laparoscopic hysterectomy, BSO, sentinel lymph node biopsy on 3/4/2024 by Dr. Gore at [place].  Pathology showed 1 negative left pelvic and 1 negative right pelvic sentinel lymph node.  There was FIGO grade 2 endometrial adenocarcinoma without myometrial invasion.  No lymphovascular invasion.  No cervical involvement.  Tubes and ovaries negative.  Washings negative.    Several months after surgery, she has started noticing vaginal spotting.  Exam on 7/17/2024 revealed 2 small 3 mm mucosal irregularities that were excised.  Pathology shows grade 2 endometrioid adenocarcinoma.    CT scan 7/31/2024 is negative for pelvic or para-aortic adenopathy.  No abnormalities near the vaginal cuff.  No other evidence of metastatic disease.    Her vaginal spotting has stopped.  Reports mild urinary urgency.  Normal bowel movements.    History of right breast cancer with right breast radiation therapy at [place] in 2012.  History of postmenopausal estrogen replacement therapy that was discontinued many years ago.  No bowel or bladder issues.    PMH  -History of right breast DCIS, high blood pressure, uterine cancer    PSH  -Cholecystectomy, hysterectomy with BSO as above, right breast lumpectomy    MEDS   acetaminophen (TYLENOL EXTRA STRENGTH) 500 mg, Every 6 hours PRN    Lisinopril-hydroCHLOROthiazide 20-12.5 MG Oral Tab Two daily    LORazepam (ATIVAN) 0.5 mg, Oral, Every 6 hours PRN    Multiple Vitamins-Minerals (MULTI-VITAMIN/MINERALS) Oral Tab 1 tablet, Oral, Daily     SH  -Lives in [place], works at [employer]  [employer]    FH  -Negative for malignancy    ROS  -pertinent items in HPI.    PHYSICAL EXAM   ECO  GENERAL: 147 pounds.  HEENT:  No neck masses, supple. No lymphadenopathy  CHEST:  Regular rate and rhythm. Normal S1 and S2.  ABD:  Soft, non-tender. No masses.  PELVIC:  No visible or palpable lesions in lower third of vagina. Small gritty mucosal irregularity along cuff but no discrete lesions visible. No blood.  EXT:  No edema.  NEURO:  Alert and oriented.     IMPRESSION/PLAN   79-year-old woman status post hysterectomy and sentinel node biopsy for grade 2 endometrial cancer in 2024.  She had no myometrial invasion.  The cervix was entirely negative.  Her nodes were negative.    Now 4-5 months later, she had vaginal spotting and findings of recurrent cancer in the vagina, upper half.  Lesions are felt to be less than 5 mm.    Referred for salvage pelvic external beam radiation followed by brachytherapy to the vagina.  Given the small volume of recurrence, plan is for intracavitary brachytherapy rather than interstitial brachytherapy.    Plan 45 Love in 25 fractions to the pelvis, followed by 5 fractions of HDR intracavitary brachytherapy, 5 Gray per fraction prescribed to 5 mm.  Will plan to be conservative, and nearly the whole length of the vagina.    We discussed potential urinary and bowel side effects of radiation therapy.  Possibility of vaginal stenosis.  Vaginal dilator and education will be provided at the end of treatment.    Brenton Gustafson MD  Radiation Oncology    CC: MARLIN Gore MD    60 minutes were spent with the patient, more than 50 percent on counseling/coordination of care (discuss disease status, goals of therapy, methods of radiation therapy, side effect discussion, role of RT in overall care)

## 2024-08-08 ENCOUNTER — HOSPITAL ENCOUNTER (OUTPATIENT)
Dept: RADIATION ONCOLOGY | Facility: HOSPITAL | Age: 79
Discharge: HOME | End: 2024-08-08
Attending: RADIOLOGY
Payer: MEDICARE

## 2024-08-08 PROCEDURE — 77334 RADIATION TREATMENT AID(S): CPT | Performed by: RADIOLOGY

## 2024-08-27 ENCOUNTER — HOSPITAL ENCOUNTER (OUTPATIENT)
Dept: RADIATION ONCOLOGY | Facility: HOSPITAL | Age: 79
Discharge: HOME | End: 2024-08-27
Attending: RADIOLOGY
Payer: MEDICARE

## 2024-08-28 NOTE — PROGRESS NOTES
[facility] Radiation Treatment Management Note 1-5    Patient:  Jesica Live  Age:  79 year old  Visit Diagnosis:    1. Endometrial cancer (HCC)      Primary Rad/Onc:  Dr. Brenton Gustafson    Site Delivered Dose (cGy) Prescribed Dose (cGy) Fraction #   PELVIS (ENDOMETRIAL) [id number] 3/25     First treatment date:  8/27/24  Concurrent chemotherapy:         8/5/2024    12:34 PM 8/29/2024     4:41 PM 8/29/2024     4:57 PM   Oncology Vitals   Height 5' 6\"     Height 168 cm     Weight 147 lb 9.6 oz  148 lb 12.8 oz   Weight 66.951 kg  67.495 kg   BSA (m2) 1.76 m2  1.76 m2   BMI 23.82 kg/m2  24.02 kg/m2   BP  159/79    Pulse  96    Resp  18    Temp  98.2 °F (36.8 °C)    SpO2  99 %    Pain Score 0 0         Toxicities:  Fatigue Grade 0= None  Constipation Grade 0= None  Diarrhea  Grade 0= None  Flatulence Grade 1= Mild symptoms; intervention not indicated  Vaginal bleeding Grade 0= None  Urinary frequency Grade 0= None  Vaginal dryness Grade 0= None  Dysuria on urination Grade 0= None  Urgency on urination Grade 0= None      Nursing Note:  Pt tolerating RT well   No complaints today  Reviewed potential SE of RT    Kylah MATTHEWS RN MD NOTE   Reviewed and agree with RN note above.  Setup imaging reviewed in ARIA and approved.    S:  -no vaginal bleeding    O:  -no changes    A/P:  -ok to have dental work  -ok to see heme about heme issue unrelated to gyne cancer    OTV in 1 week    Brenton Gusatfson MD  Radiation Oncology

## 2024-08-29 ENCOUNTER — HOSPITAL ENCOUNTER (OUTPATIENT)
Dept: RADIATION ONCOLOGY | Facility: HOSPITAL | Age: 79
Discharge: HOME | End: 2024-08-29
Attending: RADIOLOGY
Payer: MEDICARE

## 2024-08-29 VITALS
WEIGHT: 148.81 LBS | OXYGEN SATURATION: 99 % | BODY MASS INDEX: 24 KG/M2 | RESPIRATION RATE: 18 BRPM | SYSTOLIC BLOOD PRESSURE: 159 MMHG | DIASTOLIC BLOOD PRESSURE: 79 MMHG | TEMPERATURE: 98 F | HEART RATE: 96 BPM

## 2024-08-29 DIAGNOSIS — C54.1 ENDOMETRIAL CANCER (HCC): Primary | ICD-10-CM

## 2024-09-01 ENCOUNTER — HOSPITAL ENCOUNTER (OUTPATIENT)
Dept: RADIATION ONCOLOGY | Facility: HOSPITAL | Age: 79
Discharge: HOME | End: 2024-09-01
Attending: RADIOLOGY
Payer: MEDICARE

## 2024-09-03 ENCOUNTER — OFFICE VISIT (OUTPATIENT)
Dept: HEMATOLOGY/ONCOLOGY | Facility: HOSPITAL | Age: 79
End: 2024-09-03
Attending: OBSTETRICS & GYNECOLOGY
Payer: MEDICARE

## 2024-09-03 PROCEDURE — 77386 HC IMRT COMPLEX: CPT | Performed by: RADIOLOGY

## 2024-09-04 PROCEDURE — 77386 HC IMRT COMPLEX: CPT | Performed by: RADIOLOGY

## 2024-09-04 NOTE — PROGRESS NOTES
Oncology Nutrition Consultation    Patient Name: Jesica Live  YOB: 1945  Medical Record Number: [id number]   Account Number: [id number]  Dietitian: Verito Swain RD, DWIGHTN    Date of visit: 9/3/2024    Diet Rx: high protein/quality, low residue as needed    Pertinent Dx/PMH: endometrial cancer    Past Medical History:    Anxiety    Breast cancer (HCC)    right breast DCIS per pt.     Exposure to medical diagnostic radiation    Last treatment in 2012    High blood pressure    HTN (hypertension)    Hx of dilation and curettage    osteoarthritis    in hip    Uterine cancer (HCC)       TX: RT; s/p laparoscopic hysterectomy, BSO, sentinel lymph node biopsy on 3/4/2024 by Dr. Gore at [place].     Other pertinent subjective/objective information: diet hx obtained    Pertinent Meds:    Current Outpatient Medications:     Multiple Vitamins-Minerals (MULTI-VITAMIN/MINERALS) Oral Tab, Take 1 tablet by mouth daily., Disp: , Rfl:     LORazepam 0.5 MG Oral Tab, Take 1 tablet (0.5 mg total) by mouth every 6 (six) hours as needed for Anxiety., Disp: 90 tablet, Rfl: 1    Lisinopril-hydroCHLOROthiazide 20-12.5 MG Oral Tab, Two daily, Disp: 180 tablet, Rfl: 1    Pertinent Labs: noted    Height: 5'6\"            IBW: 130 +/- 10%    WT HX:   Wt Readings from Last 9 Encounters:   08/29/24 67.5 kg (148 lb 12.8 oz)   08/05/24 67 kg (147 lb 9.6 oz)   07/17/24 67.8 kg (149 lb 6.4 oz)   03/20/24 67.6 kg (149 lb)   02/21/24 68.9 kg (152 lb)   02/02/24 67.6 kg (149 lb)   12/18/23 68 kg (150 lb)   02/23/22 71.3 kg (157 lb 3.2 oz)   12/17/21 68.9 kg (152 lb)       Estimated Nutrition Needs: 24-26 kcals/kg = 7768-2997 KCALS/d; 1.5 gms protein/kg = 100 gms/d    Services Provided: Verbal and written ix provided addressing -  importance of nutrition during tx; potential nutrition related side effects of tx and ways to address (low residue w/ diarrhea)    Assessment/Plan: RD met w/ this pleasant, 78 y/o female after her RT today. Pt  noted tolerating tx well; however, did note diarrhea in small amounts.     Diet hx revealed bagel or toast and coffee for breakfast; sandwich/chips/H2O for lunch; balanced dinner w/ regular Coke and +/- cookies for dessert. 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     RD reviewed recommendations as noted requesting pt add protein at breakfast. Pt noted not caring for ONS but did note using \"Vital Protein\" powder in the past and can incorporate at breakfast.     RD offered support and will continue to monitor throughout tx.     Thank you for allowing me to participate in the care of Jesica.      The 21st Century Cures Act makes medical notes like these available to patients in the interest of transparency. Please be advised this is a medical document. Medical documents are intended to carry relevant information, facts as evident, and the clinical opinion of the practitioner. The medical note is intended as peer to peer communication and may appear blunt or direct. It is written in medical language and may contain abbreviations or verbiage that are unfamiliar.

## 2024-09-04 NOTE — PROGRESS NOTES
[facility] Radiation Treatment Management Note 6-10    Patient:  Jesica Live  Age:  79 year old  Visit Diagnosis:    1. Endometrial cancer (HCC)      Primary Rad/Onc:  Dr. Brenton Gustafson    Site Delivered Dose (cGy) Prescribed Dose (cGy) Fraction #   PELVIS (ENDOMETRIAL) [id number] 7/25     First treatment date:   8/27/24  Concurrent chemotherapy:  N/A        8/29/2024     4:41 PM 8/29/2024     4:57 PM 9/5/2024     3:44 PM   Oncology Vitals   Weight  148 lb 12.8 oz 148 lb 3.2 oz   Weight  67.495 kg 67.223 kg   BSA (m2)  1.76 m2 1.76 m2   BMI  24.02 kg/m2 23.92 kg/m2   /79  163/80   Pulse 96  99   Resp 18  20   Temp 98.2 °F (36.8 °C)  96.9 °F (36.1 °C)   SpO2 99 %  99 %   Pain Score 0  0      Toxicities:  Fatigue Grade 0= None  Constipation Grade 0= None  Diarrhea  Grade 0= None  Flatulence Grade 0= None  Vaginal bleeding Grade 0= None  Urinary frequency Grade 0= None  Vaginal dryness Grade 0= None  Dysuria on urination Grade 0= None  Urgency on urination Grade 0= None  Dermatitis associated with radiation Grade 0= None    Nursing Note:  Feeling well, appetite good.  No bowel issues at this time.    Honey MATTHEWS RN MD NOTE   Reviewed and agree with RN note above.  Setup imaging reviewed in ARIA and approved.    S:  -no bowel or bladder issues  -no vaginal bleeding    O:  -no changes    A/P:  -cont pelvic RT  OTV in 1 week    Brenton Gustafson MD  Radiation Oncology

## 2024-09-05 ENCOUNTER — HOSPITAL ENCOUNTER (OUTPATIENT)
Dept: RADIATION ONCOLOGY | Facility: HOSPITAL | Age: 79
Discharge: HOME | End: 2024-09-05
Attending: RADIOLOGY
Payer: MEDICARE

## 2024-09-05 VITALS
BODY MASS INDEX: 24 KG/M2 | DIASTOLIC BLOOD PRESSURE: 80 MMHG | OXYGEN SATURATION: 99 % | HEART RATE: 99 BPM | TEMPERATURE: 97 F | SYSTOLIC BLOOD PRESSURE: 163 MMHG | RESPIRATION RATE: 20 BRPM | WEIGHT: 148.19 LBS

## 2024-09-05 DIAGNOSIS — C54.1 ENDOMETRIAL CANCER (HCC): Primary | ICD-10-CM

## 2024-09-05 PROCEDURE — 77386 HC IMRT COMPLEX: CPT | Performed by: RADIOLOGY

## 2024-09-06 PROCEDURE — 77336 RADIATION PHYSICS CONSULT: CPT | Performed by: RADIOLOGY

## 2024-09-06 PROCEDURE — 77386 HC IMRT COMPLEX: CPT | Performed by: RADIOLOGY

## 2024-09-09 PROCEDURE — 77386 HC IMRT COMPLEX: CPT | Performed by: RADIOLOGY

## 2024-09-10 PROCEDURE — 77386 HC IMRT COMPLEX: CPT | Performed by: RADIOLOGY

## 2024-09-11 PROCEDURE — 77386 HC IMRT COMPLEX: CPT | Performed by: RADIOLOGY

## 2024-09-11 NOTE — PROGRESS NOTES
[facility] Radiation Treatment Management Note 11-15    Patient:  Jesica Live  Age:  79 year old  Visit Diagnosis:    1. Endometrial cancer (HCC)      Primary Rad/Onc:  Dr. Brenton Gustafson    Site Delivered Dose (cGy) Prescribed Dose (cGy) Fraction #   PELVIS (ENDOMETRIAL) [id number] 12/25     First treatment date:  8/27/24  Concurrent chemotherapy: N/A        9/5/2024     3:44 PM 9/12/2024     4:27 PM 9/12/2024     4:30 PM   Oncology Vitals   Weight 148 lb 3.2 oz 147 lb 3.2 oz    Weight 67.223 kg 66.769 kg    BSA (m2) 1.76 m2 1.76 m2    BMI 23.92 kg/m2 23.76 kg/m2    /80  127/77   Pulse 99  88   Resp 20  18   Temp 96.9 °F (36.1 °C)  98.6 °F (37 °C)   SpO2 99 %  98 %   Pain Score 0  0        Toxicities:  Fatigue Grade 0= None  Constipation Grade 0= None  Diarrhea  Grade 1= Increase of <4 stools per day over baseline; mild increase in ostomy output compared to baseline   Flatulence Grade 0= None  Vaginal bleeding Grade 0= None  Urinary frequency Grade 0= None  Vaginal dryness Grade 1= Mild vaginal dryness not interfering with sexual function  Dysuria on urination Grade 0= None  Urgency on urination Grade 0= None      Nursing Note:  Diarrhea started last week  Diarrhea 3-4x/day  Hasn't tried imodium  Feels like vagina is a little irritated inside  Denies any urinary symptoms    Kylah MATTHEWS RN MD NOTE   Reviewed and agree with RN note above.  Setup imaging reviewed in ARIA and approved.    S:  -vaginal irritation, some diarrhea    O:  -no changes     A/P:  -steroid cream twice a day to vulvar skin, sitz baths are hard for her b/c of her knee, so rinse after every bathroom trip in shower, use Imodium prn and watch for diet triggers of diarrhea    OTV in 1 week    Brenton Gustafson MD  Radiation Oncology

## 2024-09-12 ENCOUNTER — HOSPITAL ENCOUNTER (OUTPATIENT)
Dept: RADIATION ONCOLOGY | Facility: HOSPITAL | Age: 79
Discharge: HOME | End: 2024-09-12
Attending: RADIOLOGY
Payer: MEDICARE

## 2024-09-12 VITALS
TEMPERATURE: 99 F | BODY MASS INDEX: 24 KG/M2 | OXYGEN SATURATION: 98 % | SYSTOLIC BLOOD PRESSURE: 127 MMHG | RESPIRATION RATE: 18 BRPM | HEART RATE: 88 BPM | WEIGHT: 147.19 LBS | DIASTOLIC BLOOD PRESSURE: 77 MMHG

## 2024-09-12 DIAGNOSIS — C54.1 ENDOMETRIAL CANCER (HCC): Primary | ICD-10-CM

## 2024-09-12 PROCEDURE — 77386 HC IMRT COMPLEX: CPT | Performed by: RADIOLOGY

## 2024-09-13 PROCEDURE — 77386 HC IMRT COMPLEX: CPT | Performed by: RADIOLOGY

## 2024-09-13 PROCEDURE — 77336 RADIATION PHYSICS CONSULT: CPT | Performed by: RADIOLOGY

## 2024-09-16 DIAGNOSIS — C54.1 ENDOMETRIAL CANCER (HCC): Primary | ICD-10-CM

## 2024-09-16 PROCEDURE — 77386 HC IMRT COMPLEX: CPT | Performed by: RADIOLOGY

## 2024-09-16 RX ORDER — CLOTRIMAZOLE AND BETAMETHASONE DIPROPIONATE 10; .64 MG/G; MG/G
CREAM TOPICAL
Qty: 45 G | Refills: 0 | Status: SHIPPED | OUTPATIENT
Start: 2024-09-16

## 2024-09-17 PROCEDURE — 77386 HC IMRT COMPLEX: CPT | Performed by: RADIOLOGY

## 2024-09-18 PROCEDURE — 77386 HC IMRT COMPLEX: CPT | Performed by: RADIOLOGY

## 2024-09-18 NOTE — PROGRESS NOTES
[facility] Radiation Treatment Management Note 16-20    Patient:  Jesica Live  Age:  79 year old  Visit Diagnosis:    1. Endometrial cancer (HCC)      Primary Rad/Onc:  Dr. Brenton Gustafson    Site Delivered Dose (cGy) Prescribed Dose (cGy) Fraction #   PELVIS (ENDOMETRIAL) [id number] 17/25     First treatment date:   8/27/24  Concurrent chemotherapy:  N/A        9/12/2024     4:27 PM 9/12/2024     4:30 PM 9/19/2024     4:13 PM   Oncology Vitals   Weight 147 lb 3.2 oz  147 lb   Weight 66.769 kg  66.679 kg   BSA (m2) 1.76 m2  1.76 m2   BMI 23.76 kg/m2  23.73 kg/m2   BP  127/77 138/82   Pulse  88 88   Resp  18 16   Temp  98.6 °F (37 °C)    SpO2  98 % 96 %   Pain Score  0 0        Toxicities:  Fatigue Grade 1= Fatigue relieved by rest  Constipation Grade 0= None  Diarrhea  Grade 1= Increase of <4 stools per day over baseline; mild increase in ostomy output compared to baseline   Flatulence Grade 0= None  Vaginal bleeding Grade 0= None  Urinary frequency Grade 0= None  Vaginal dryness Grade 0= None  Dysuria on urination Grade 0= None  Urgency on urination Grade 0= None  Dermatitis associated with radiation Grade 0= None    Nursing Note:  Has 2-3 episodes of diarrhea per day.  Denies rectal irritation or bleeding.  Taking Imodium PRN with relief.  Denies vaginal discharge or pelvic pain.    Honey MATTHEWS RN MD NOTE   Reviewed and agree with RN note above.  Setup imaging reviewed in ARIA and approved.    S:  -diarrhea. Watching diet. Can't find triggers or patterns.  No vulvar/vaginal irritation.  Using steroid cream on skin.    O:  -no changes    A/P:  -cont RT  -Imodium PRN    OTV in 1 week    Brenton Gustafson MD  Radiation Oncology

## 2024-09-19 ENCOUNTER — HOSPITAL ENCOUNTER (OUTPATIENT)
Dept: RADIATION ONCOLOGY | Facility: HOSPITAL | Age: 79
Discharge: HOME | End: 2024-09-19
Attending: RADIOLOGY
Payer: MEDICARE

## 2024-09-19 VITALS
OXYGEN SATURATION: 96 % | DIASTOLIC BLOOD PRESSURE: 82 MMHG | RESPIRATION RATE: 16 BRPM | BODY MASS INDEX: 24 KG/M2 | HEART RATE: 88 BPM | SYSTOLIC BLOOD PRESSURE: 138 MMHG | WEIGHT: 147 LBS

## 2024-09-19 DIAGNOSIS — C54.1 ENDOMETRIAL CANCER (HCC): Primary | ICD-10-CM

## 2024-09-19 PROCEDURE — 77386 HC IMRT COMPLEX: CPT | Performed by: RADIOLOGY

## 2024-09-20 PROCEDURE — 77386 HC IMRT COMPLEX: CPT | Performed by: RADIOLOGY

## 2024-09-20 PROCEDURE — 77336 RADIATION PHYSICS CONSULT: CPT | Performed by: RADIOLOGY

## 2024-09-23 PROCEDURE — 77386 HC IMRT COMPLEX: CPT | Performed by: RADIOLOGY

## 2024-09-24 PROCEDURE — 77386 HC IMRT COMPLEX: CPT | Performed by: RADIOLOGY

## 2024-09-25 PROCEDURE — 77386 HC IMRT COMPLEX: CPT | Performed by: RADIOLOGY

## 2024-09-26 ENCOUNTER — HOSPITAL ENCOUNTER (OUTPATIENT)
Dept: RADIATION ONCOLOGY | Facility: HOSPITAL | Age: 79
Discharge: HOME | End: 2024-09-26
Attending: RADIOLOGY
Payer: MEDICARE

## 2024-09-26 VITALS
TEMPERATURE: 99 F | SYSTOLIC BLOOD PRESSURE: 132 MMHG | DIASTOLIC BLOOD PRESSURE: 78 MMHG | RESPIRATION RATE: 18 BRPM | HEART RATE: 82 BPM | OXYGEN SATURATION: 97 %

## 2024-09-26 DIAGNOSIS — C54.1 ENDOMETRIAL CANCER (HCC): Primary | ICD-10-CM

## 2024-09-26 PROCEDURE — 77386 HC IMRT COMPLEX: CPT | Performed by: RADIOLOGY

## 2024-09-26 NOTE — PROGRESS NOTES
[facility] Radiation Treatment Management Note 21-25    Patient:  Jesica Live  Age:  79 year old  Visit Diagnosis:    1. Endometrial cancer (HCC)      Primary Rad/Onc:  Dr. Brenton Gustafson    Site Delivered Dose (cGy) Prescribed Dose (cGy) Fraction #   PELVIS (ENDOMETRIAL) [id number] 22/25   HDR vaginal cylinder 0 2500 0/5     First treatment date:  8/27/24  Concurrent chemotherapy: N/A        9/12/2024     4:30 PM 9/19/2024     4:13 PM 9/26/2024     4:24 PM   Oncology Vitals   Weight  147 lb    Weight  66.679 kg    BSA (m2)  1.76 m2    BMI  23.73 kg/m2    /77 138/82 132/78   Pulse 88 88 82   Resp 18 16 18   Temp 98.6 °F (37 °C)  98.8 °F (37.1 °C)   SpO2 98 % 96 % 97 %   Pain Score 0 0 0        Toxicities:  Fatigue Grade 1= Fatigue relieved by rest  Constipation Grade 0= None  Diarrhea  Grade 1= Increase of <4 stools per day over baseline; mild increase in ostomy output compared to baseline   Flatulence Grade 0= None  Vaginal bleeding Grade 0= None  Urinary frequency Grade 0= None  Vaginal dryness Grade 0= None  Dysuria on urination Grade 0= None  Urgency on urination Grade 0= None    Nursing Note:  Has some vaginal skin irritation  Using clotrimazole cream BID  Has diarrhea x2 per day   Using imodium with relief    Wt Readings from Last 6 Encounters:   09/19/24 66.7 kg (147 lb)   09/12/24 66.8 kg (147 lb 3.2 oz)   09/05/24 67.2 kg (148 lb 3.2 oz)   08/29/24 67.5 kg (148 lb 12.8 oz)   08/05/24 67 kg (147 lb 9.6 oz)   07/17/24 67.8 kg (149 lb 6.4 oz)        Kylah MATTHEWS RN MD NOTE   Reviewed and agree with RN note above.  Setup imaging reviewed in ARIA and approved.    S:  -no vaginal bleeding  Diarrhea controlled w/ Imodium    O:  -no changes    A/P:  -cont RT    OTV in 1 week    Brenton Gustafson MD  Radiation Oncology

## 2024-09-27 PROCEDURE — 77386 HC IMRT COMPLEX: CPT | Performed by: RADIOLOGY

## 2024-09-27 PROCEDURE — 77336 RADIATION PHYSICS CONSULT: CPT | Performed by: RADIOLOGY

## 2024-09-30 PROCEDURE — 77386 HC IMRT COMPLEX: CPT | Performed by: RADIOLOGY

## 2024-10-01 ENCOUNTER — HOSPITAL ENCOUNTER (OUTPATIENT)
Dept: RADIATION ONCOLOGY | Facility: HOSPITAL | Age: 79
Discharge: HOME | End: 2024-10-01
Attending: RADIOLOGY
Payer: MEDICARE

## 2024-10-01 PROCEDURE — 77386 HC IMRT COMPLEX: CPT | Performed by: RADIOLOGY

## 2024-10-01 PROCEDURE — 77336 RADIATION PHYSICS CONSULT: CPT | Performed by: RADIOLOGY

## 2024-10-07 ENCOUNTER — HOSPITAL ENCOUNTER (OUTPATIENT)
Dept: RADIATION ONCOLOGY | Facility: HOSPITAL | Age: 79
Discharge: HOME | End: 2024-10-07
Attending: RADIOLOGY
Payer: MEDICARE

## 2024-10-07 PROCEDURE — 77470 SPECIAL RADIATION TREATMENT: CPT | Performed by: RADIOLOGY

## 2024-10-07 PROCEDURE — 77334 RADIATION TREATMENT AID(S): CPT | Performed by: RADIOLOGY

## 2024-10-10 PROCEDURE — 77300 RADIATION THERAPY DOSE PLAN: CPT | Performed by: RADIOLOGY

## 2024-10-10 PROCEDURE — 77295 3-D RADIOTHERAPY PLAN: CPT | Performed by: RADIOLOGY

## 2024-10-10 PROCEDURE — 77370 RADIATION PHYSICS CONSULT: CPT | Performed by: RADIOLOGY

## 2024-10-14 ENCOUNTER — HOSPITAL ENCOUNTER (OUTPATIENT)
Dept: RADIATION ONCOLOGY | Facility: HOSPITAL | Age: 79
End: 2024-10-14
Attending: RADIOLOGY
Payer: MEDICARE

## 2024-10-14 PROCEDURE — 77770 HDR RDNCL NTRSTL/ICAV BRCHTX: CPT | Performed by: RADIOLOGY

## 2024-10-14 PROCEDURE — 77333 RADIATION TREATMENT AID(S): CPT | Performed by: RADIOLOGY

## 2024-10-14 PROCEDURE — 57156 INS VAG BRACHYTX DEVICE: CPT | Performed by: RADIOLOGY

## 2024-10-14 NOTE — PROGRESS NOTES
Pt here for HDR treatment today  Fraction #1 of 5    Vagina numbed with lidocaine gel  Cylinder 30mm inserted by Dr. Gustafson  Pt tolerated radiation treatment without incident  Cylinder removed by Dr. Gustafson  No blood noted  Reviewed potential SE with pt  Next treatment scheduled for Thursday 10/17/24

## 2024-10-17 ENCOUNTER — HOSPITAL ENCOUNTER (OUTPATIENT)
Dept: RADIATION ONCOLOGY | Facility: HOSPITAL | Age: 79
Discharge: HOME | End: 2024-10-17
Attending: RADIOLOGY
Payer: MEDICARE

## 2024-10-17 PROCEDURE — 77770 HDR RDNCL NTRSTL/ICAV BRCHTX: CPT | Performed by: RADIOLOGY

## 2024-10-17 PROCEDURE — 57156 INS VAG BRACHYTX DEVICE: CPT | Performed by: RADIOLOGY

## 2024-10-17 NOTE — PROGRESS NOTES
[facility]  RADIATION ONCOLOGY   FOLLOW UP     Jesica Live  4/23/1945    Pt here for HDR treatment today  Fraction #2 of 5    Vagina numbed with lidocaine gel  Cylinder 30mm inserted by Dr. Gustafson  Pt tolerated radiation treatment without incident  Cylinder removed by Dr. Gustafson  No blood noted  Reviewed potential SE with pt  Next treatment scheduled for Monday 10/21/2024

## 2024-10-21 ENCOUNTER — HOSPITAL ENCOUNTER (OUTPATIENT)
Dept: RADIATION ONCOLOGY | Facility: HOSPITAL | Age: 79
Discharge: HOME | End: 2024-10-21
Attending: RADIOLOGY
Payer: MEDICARE

## 2024-10-21 PROCEDURE — 77770 HDR RDNCL NTRSTL/ICAV BRCHTX: CPT | Performed by: RADIOLOGY

## 2024-10-21 PROCEDURE — 57156 INS VAG BRACHYTX DEVICE: CPT | Performed by: RADIOLOGY

## 2024-10-24 ENCOUNTER — HOSPITAL ENCOUNTER (OUTPATIENT)
Dept: RADIATION ONCOLOGY | Facility: HOSPITAL | Age: 79
Discharge: HOME | End: 2024-10-24
Attending: RADIOLOGY
Payer: MEDICARE

## 2024-10-24 PROCEDURE — 57156 INS VAG BRACHYTX DEVICE: CPT | Performed by: RADIOLOGY

## 2024-10-24 PROCEDURE — 77770 HDR RDNCL NTRSTL/ICAV BRCHTX: CPT | Performed by: RADIOLOGY

## 2024-10-24 NOTE — PROGRESS NOTES
Pt here for #4 of 5 HDR cylinder treatment  Pt reports had one episode of diarrhea on Monday, 10/21.  Used imodium with relief  Is monitoring her diet to avoid foods that may increase diarrhea  Has met with Verito and has her \"list\" of foods to avoid  Took imodium x 1 this morning prior to coming in for treatment  Denies any urinary symptoms  Denies vaginal bleeding or discharge    Numbed vagina with lidocaine gel  Cylinder 30mm inserted by Dr. Gustafson  Pt tolerated RT well  Cylinder removed by Dr. Gustafson  No blood noted    Pt has nursing phone number for any questions or concerns.

## 2024-10-28 ENCOUNTER — HOSPITAL ENCOUNTER (OUTPATIENT)
Dept: RADIATION ONCOLOGY | Facility: HOSPITAL | Age: 79
Discharge: HOME | End: 2024-10-28
Attending: RADIOLOGY
Payer: MEDICARE

## 2024-10-28 ENCOUNTER — DOCUMENTATION ONLY (OUTPATIENT)
Dept: RADIATION ONCOLOGY | Facility: HOSPITAL | Age: 79
End: 2024-10-28

## 2024-10-28 VITALS
OXYGEN SATURATION: 98 % | SYSTOLIC BLOOD PRESSURE: 127 MMHG | RESPIRATION RATE: 16 BRPM | TEMPERATURE: 98 F | HEART RATE: 83 BPM | DIASTOLIC BLOOD PRESSURE: 67 MMHG

## 2024-10-28 DIAGNOSIS — C54.1 ENDOMETRIAL CANCER (HCC): Primary | ICD-10-CM

## 2024-10-28 PROCEDURE — 77770 HDR RDNCL NTRSTL/ICAV BRCHTX: CPT | Performed by: RADIOLOGY

## 2024-10-28 PROCEDURE — 77333 RADIATION TREATMENT AID(S): CPT | Performed by: RADIOLOGY

## 2024-10-28 PROCEDURE — 57156 INS VAG BRACHYTX DEVICE: CPT | Performed by: RADIOLOGY

## 2024-10-28 NOTE — PATIENT INSTRUCTIONS
- WE WILL CALL TO SCHEDULE YOU FOR A FOLLOW-UP WITH DR. BOATENG IN 3MONTHS  - SIDE EFFECTS OF RADIATION WILL GRADUALLY SUBSIDE. IT MAY TAKE 1- 2 WEEKS POST-RADIATION FOR YOU TO NOTICE CHANGES SUCH AS A DECREASE IN YOUR FATIGUE LEVEL, DECREASE IN URINARY SYMPTOMS (FREQUENCY, URGENCY), DECREASE IN BOWEL SYMPTOMS (LOOSE STOOLS/DIARRHEA), DECREASE IN PELVIC DISCOMFORT.   - CALL THE NURSE LINE AT [phone] IF YOU HAVE ANY QUESTIONS/CONCERNS REGARDING RADIATION THERAPY.

## 2024-10-30 NOTE — PROGRESS NOTES
[facility]  RADIATION ONCOLOGY      Jesica Live  4/23/1945    DIAGNOSIS: Endometrial cancer    Jesica completed 5 of 5 HDR brachytherapy treatments to the vagina today. Some episodes of diarrhea still, using Imodium. No urinary issues. No vaginal bleeding.    F/u in 3 mo   F/u Dr Gore

## 2024-11-20 ENCOUNTER — OFFICE VISIT (OUTPATIENT)
Dept: HEMATOLOGY/ONCOLOGY | Facility: HOSPITAL | Age: 79
End: 2024-11-20
Attending: OBSTETRICS & GYNECOLOGY
Payer: MEDICARE

## 2024-11-20 VITALS
HEIGHT: 66.5 IN | TEMPERATURE: 98 F | DIASTOLIC BLOOD PRESSURE: 77 MMHG | RESPIRATION RATE: 16 BRPM | BODY MASS INDEX: 22.55 KG/M2 | SYSTOLIC BLOOD PRESSURE: 158 MMHG | OXYGEN SATURATION: 96 % | HEART RATE: 95 BPM | WEIGHT: 142 LBS

## 2024-11-20 DIAGNOSIS — C54.1 ENDOMETRIAL CANCER (HCC): Primary | ICD-10-CM

## 2024-11-20 PROCEDURE — 99211 OFF/OP EST MAY X REQ PHY/QHP: CPT

## 2024-11-20 NOTE — PROGRESS NOTES
Jesica Live returns today s/p robotic assisted TLH BSO and staging for a stage Ia G 2 endometrial cancer 3/2024 (0/0mm, negative LVSI). She presents for  exam.    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    She underwent external radiation to the pelvis followed by high-dose brachytherapy to the vaginal cuff.    PHYSICAL EXAMINATION:  /77 (BP Location: Left arm, Patient Position: Sitting, Cuff Size: adult)   Pulse 95   Temp 97.5 °F (36.4 °C) (Temporal)   Resp 16   Ht 1.689 m (5' 6.5\")   Wt 64.4 kg (142 lb)   SpO2 96%   BMI 22.58 kg/m²    On abdominal exam the incision is healing well. No discharge or errythema. Vaginal cuff is intact.  There is no evidence of any granulation tissue..  There is no evidence of any no pelvic masses or fluid collections.  She does have fibrosis at the vaginal cuff.    Local recurrence at the vaginal cuff.  Status post radiation with vaginal cuff brachytherapy.  She currently is KEILY.      PLAN:  Return to clinic in 3 months time with a CT scan.          carbon copy: [employer]

## 2024-11-20 NOTE — PROGRESS NOTES
Patient is here for 4 month MD follow up for Endometrial cancer. Denies any vaginal bleeding or discharge. Denies pain. Feeling well. No concerns at present time. Patient completed vaginal/pelvic radiation on 10/28. Occasional loose stools but improved since radiation completed.     Education Record    Learner:  Patient    Disease / Diagnosis:  Endometrial cancer     Barriers / Limitations:  None   Comments:    Method:  Discussion   Comments:    General Topics:  Plan of care reviewed   Comments:    Outcome:  Shows understanding   Comments:

## 2024-11-20 NOTE — PROGRESS NOTES
[facility] RADIATION ONCOLOGY  TREATMENT SUMMARY    PATIENT:  Jesica JANG Maria T    REFERRING MD: MARLIN Gore MD  DIAGNOSIS:  Endometrial cancer    HISTORY:  Hx of R breast cancer s/p surgery and right breast RT in 2010. Then developed vaginal bleeding and underwent TH BSO with SN Bx in 3/2024 at [place]. Stage IA grade 2 endometrial cancer. No LVI. No adjuvant therapy was advised at that time.     Now with 2 small <5 mm mucosal lesions excised with pathology showing recurrent endometrial cancer in 7/2024, just 4 months after surgery. Referred for radiation therapy.    DOSE DELIVERED:    Pelvis   4500 cGy in 25 fractions   6 MV photons   VMAT with IGRT   8/27/2024 to 10/1/2024     Vagina   2250 cGy in 5 fractions   Ir 192   HDR vaginal cylinder   10/14/2024 to 10/28/2024     PLAN:  F/u 3 mo  F/u gyne onc    Brenton Gustafson M.D.  Radiation Oncology

## 2024-11-20 NOTE — PROGRESS NOTES
Patient here for [facility] follow up asking questions abour her elevated kappa chains from 2023. Patient saw her PCP in July and was advised to follow up with hematology. Patient was scheduled while in the office with Dr Luong on 12/17.

## 2024-12-09 ENCOUNTER — TELEPHONE (OUTPATIENT)
Dept: RADIATION ONCOLOGY | Facility: HOSPITAL | Age: 79
End: 2024-12-09

## 2024-12-09 NOTE — TELEPHONE ENCOUNTER
Spoke with pt  Pt states she's having loose and watery stool 3-4x per week  Reports stool is mustard color  Is using imodium PRN    Dr. Gustafson notified and he called pt

## 2024-12-09 NOTE — TELEPHONE ENCOUNTER
Jesica calling she is still having diarrhea still from her treatments. She did finish treatment 5 weeks ago. Thank you Fartun

## 2024-12-09 NOTE — TELEPHONE ENCOUNTER
Patient called still having occ loose stools, using 2-3 Imodium per week. No blood. No urinary issues.    Advised continue prn Imodium, try adding daily Benefiber.  Also needs to come in to  vaginal dilator kit.

## 2024-12-10 ENCOUNTER — TELEPHONE (OUTPATIENT)
Dept: HEMATOLOGY/ONCOLOGY | Facility: HOSPITAL | Age: 79
End: 2024-12-10

## 2024-12-10 NOTE — TELEPHONE ENCOUNTER
Pt would like to reschedule her new consult appt with Dr. Luong on 12/17/24 at 1pm. Pt advised she has another appt that day that she forgot about    Please give the pt a call back

## 2024-12-17 ENCOUNTER — TELEPHONE (OUTPATIENT)
Dept: RADIATION ONCOLOGY | Facility: HOSPITAL | Age: 79
End: 2024-12-17

## 2024-12-17 ENCOUNTER — APPOINTMENT (OUTPATIENT)
Dept: HEMATOLOGY/ONCOLOGY | Facility: HOSPITAL | Age: 79
End: 2024-12-17
Attending: OBSTETRICS & GYNECOLOGY
Payer: MEDICARE

## 2024-12-17 NOTE — TELEPHONE ENCOUNTER
Pt called stated someone is supposed to call her to  a kit and no one has called her yet    Please give the pt a call back

## 2024-12-18 ENCOUNTER — HOSPITAL ENCOUNTER (OUTPATIENT)
Dept: RADIATION ONCOLOGY | Facility: HOSPITAL | Age: 79
Discharge: HOME | End: 2024-12-18
Attending: RADIOLOGY
Payer: MEDICARE

## 2024-12-18 NOTE — PROGRESS NOTES
Pt here for dilator teaching  Gave dilator kit and handout on dilator use  All questions answered  Pt due for f/u with Dr. Gustafson in Jan 2025  Dania to call pt for f/u appt

## 2025-01-10 NOTE — PROGRESS NOTES
[facility] Hematology and Oncology Clinic Note    Visit Diagnosis:  1. Abnormal SPEP        History of Present Illness: 79F with a PMH of DCIS, Uterine cancer, HTN and HLD was referred by Dr. Fragoso to discuss an abnormal SPEP. She had an SPEP done in in May 2023-[place] LC 27.6, Lambda LC 10.4, Ratio 2.65. No M spike. Normal Cr, Ca and Hb at the time. This was drawn due to neuropathy. However, numbness in hand thought to be related to carpel tunnel syndrome.     No fevers, night sweats or weight loss. No bone pain. Energy levels are good.     Review of Systems: 12 Point ROS was completed and pertinent positives are in the HPI    Medications Ordered Prior to Encounter[1]  Past Medical History:    Anxiety    Breast cancer (HCC)    right breast DCIS per pt.     Exposure to medical diagnostic radiation    Last treatment in 2012    High blood pressure    HTN (hypertension)    Hx of dilation and curettage    osteoarthritis    in hip    Uterine cancer (HCC)     Past Surgical History:   Procedure Laterality Date    Cholecystectomy  07/18/2015    Laparoscopic by Dr. Feldman    D & c  1974    Hysterectomy  03/04/2024    [facility] BSO at [place]    Lumpectomy right  2012    x3 so the same area    Needle biopsy left      Radiation right  2012     Social History     Socioeconomic History    Marital status:     Number of children: 1   Occupational History    Occupation: works at [employer]   Tobacco Use    Smoking status: Former    Smokeless tobacco: Never    Tobacco comments:     Quit 50 yrs ago   Vaping Use    Vaping status: Never Used   Substance and Sexual Activity    Alcohol use: No    Drug use: No    Sexual activity: Not Currently     Partners: Male     Birth control/protection: Hysterectomy      Family History   Problem Relation Age of Onset    Diabetes Mother         TYPE II    Hypertension Mother        Physical Exam  Height: 168.9 cm (5' 6.5\") (01/13 1431)  Weight: 63.5 kg (140 lb) (01/13 1431)  BSA (Calculated -  sq m): 1.73 sq meters (01/13 1431)  Pulse: 102 (01/13 1431)  BP: 172/80 (01/13 1431)  Temp: 97.9 °F (36.6 °C) (01/13 1431)  Do Not Use - Resp Rate: --  SpO2: 99 % (01/13 1431)    General: NAD, AOX3  HEENT: clear op, mmm, no jvd, no scleral icterus  CV: RR  Extremities: No edema   Lungs:  no increased work of breathing  Abd: non distended   Neuro: CN: II-XII grossly intact      Results:  Lab Results   Component Value Date    WBC 9.4 12/18/2023    HGB 13.4 12/18/2023    HCT 39.2 12/18/2023    MCV 93.8 12/18/2023    .0 12/18/2023       No results for input(s): \"GLU\", \"BUN\", \"CREATSERUM\", \"GFRAA\", \"GFRNAA\", \"EGFRCR\", \"CA\", \"ALB\", \"NA\", \"K\", \"CL\", \"CO2\", \"ALKPHO\", \"AST\", \"ALT\", \"BILT\", \"TP\" in the last 168 hours.    Radiology:  reviewed    Assessment and Plan:  Abnormal SPEP  -In May 2023, she had elevated Kappa LC with a slightly abnormal SFLC ratio of 2.65 but no evidence of end organ damage. Likely benign elevation   -Repeat CBC, CMP, SPEP, Immunoglobulins and Bence Ybarra Protein   -Discussed bone marrow if there is evidence end organ damage, SFLC ratio > 8 or with possible M spike     History of Uterine Cancer, MSI-H  -Follows with Dr. Gore and Dr. Gustafson  -s/p TLH-BSO in March 2024 and with local recurrence at vaginal cuff s/p  RT in 2024  -sporadic cancer (MLH1 hypermethylated)     History of DCIS  -R breast. S/P 3 lumpectomies and RT  -s/p 5 years of Tamoxifen. Finished around 2017    >60 min spent in preparation, face to face and in documentation     ELIAS Luong MD  [facility]         [1]   Current Outpatient Medications on File Prior to Visit   Medication Sig Dispense Refill    Multiple Vitamins-Minerals (MULTI-VITAMIN/MINERALS) Oral Tab Take 1 tablet by mouth daily.      LORazepam 0.5 MG Oral Tab Take 1 tablet (0.5 mg total) by mouth every 6 (six) hours as needed for Anxiety. 90 tablet 1    Lisinopril-hydroCHLOROthiazide 20-12.5 MG Oral Tab Two daily 180 tablet 1     CLOTRIMAZOLE-BETAMETHASONE 1-0.05 % External Cream Apply to affected area twice a day for 10 days (Patient not taking: Reported on 1/13/2025) 45 g 0     No current facility-administered medications on file prior to visit.

## 2025-01-13 ENCOUNTER — OFFICE VISIT (OUTPATIENT)
Age: 80
End: 2025-01-13
Attending: OBSTETRICS & GYNECOLOGY
Payer: MEDICARE

## 2025-01-13 VITALS
TEMPERATURE: 98 F | SYSTOLIC BLOOD PRESSURE: 172 MMHG | BODY MASS INDEX: 22.23 KG/M2 | DIASTOLIC BLOOD PRESSURE: 80 MMHG | HEIGHT: 66.5 IN | RESPIRATION RATE: 18 BRPM | HEART RATE: 102 BPM | OXYGEN SATURATION: 99 % | WEIGHT: 140 LBS

## 2025-01-13 DIAGNOSIS — R77.8 ABNORMAL SPEP: Primary | ICD-10-CM

## 2025-01-13 LAB
ALBUMIN SERPL-MCNC: 4.4 G/DL (ref 3.2–4.8)
ALBUMIN/GLOB SERPL: 1.5 {RATIO} (ref 1–2)
ALP LIVER SERPL-CCNC: 84 U/L
ALT SERPL-CCNC: 15 U/L
ANION GAP SERPL CALC-SCNC: 7 MMOL/L (ref 0–18)
AST SERPL-CCNC: 20 U/L (ref ?–34)
BASOPHILS # BLD AUTO: 0.03 X10(3) UL (ref 0–0.2)
BASOPHILS NFR BLD AUTO: 0.7 %
BILIRUB SERPL-MCNC: 0.4 MG/DL (ref 0.2–1.1)
BUN BLD-MCNC: 23 MG/DL (ref 9–23)
CALCIUM BLD-MCNC: 10.3 MG/DL (ref 8.7–10.4)
CHLORIDE SERPL-SCNC: 103 MMOL/L (ref 98–112)
CO2 SERPL-SCNC: 32 MMOL/L (ref 21–32)
CREAT BLD-MCNC: 0.81 MG/DL
CRP SERPL-MCNC: <0.4 MG/DL (ref ?–0.5)
EGFRCR SERPLBLD CKD-EPI 2021: 75 ML/MIN/1.73M2 (ref 60–?)
EOSINOPHIL # BLD AUTO: 0.27 X10(3) UL (ref 0–0.7)
EOSINOPHIL NFR BLD AUTO: 6.7 %
ERYTHROCYTE [DISTWIDTH] IN BLOOD BY AUTOMATED COUNT: 12.7 %
FASTING STATUS PATIENT QL REPORTED: NO
GLOBULIN PLAS-MCNC: 2.9 G/DL (ref 2–3.5)
GLUCOSE BLD-MCNC: 125 MG/DL (ref 70–99)
HCT VFR BLD AUTO: 36.7 %
HGB BLD-MCNC: 12.9 G/DL
IGA SERPL-MCNC: 305 MG/DL (ref 40–350)
IGM SERPL-MCNC: 89.5 MG/DL (ref 50–300)
IMM GRANULOCYTES # BLD AUTO: 0.01 X10(3) UL (ref 0–1)
IMM GRANULOCYTES NFR BLD: 0.2 %
IMMUNOGLOBULIN PNL SER-MCNC: 1131 MG/DL (ref 650–1600)
LYMPHOCYTES # BLD AUTO: 0.71 X10(3) UL (ref 1–4)
LYMPHOCYTES NFR BLD AUTO: 17.5 %
MCH RBC QN AUTO: 34.4 PG (ref 26–34)
MCHC RBC AUTO-ENTMCNC: 35.1 G/DL (ref 31–37)
MCV RBC AUTO: 97.9 FL
MONOCYTES # BLD AUTO: 0.43 X10(3) UL (ref 0.1–1)
MONOCYTES NFR BLD AUTO: 10.6 %
NEUTROPHILS # BLD AUTO: 2.6 X10 (3) UL (ref 1.5–7.7)
NEUTROPHILS # BLD AUTO: 2.6 X10(3) UL (ref 1.5–7.7)
NEUTROPHILS NFR BLD AUTO: 64.3 %
OSMOLALITY SERPL CALC.SUM OF ELEC: 299 MOSM/KG (ref 275–295)
PLATELET # BLD AUTO: 282 10(3)UL (ref 150–450)
POTASSIUM SERPL-SCNC: 3.2 MMOL/L (ref 3.5–5.1)
PROT SERPL-MCNC: 7.3 G/DL (ref 5.7–8.2)
PROT UR-MCNC: 8.8 MG/DL (ref ?–14)
RBC # BLD AUTO: 3.75 X10(6)UL
SODIUM SERPL-SCNC: 142 MMOL/L (ref 136–145)
WBC # BLD AUTO: 4.1 X10(3) UL (ref 4–11)

## 2025-01-13 NOTE — PROGRESS NOTES
Patient here as a new consult for abnormal lab work. States energy levels are stable. Denies any bone aches, fevers, chills, night sweats, new sources of pain, headaches, nausea, or any other changes at this time. Will have a CT later this month.

## 2025-01-14 DIAGNOSIS — R77.8 ABNORMAL SPEP: Primary | ICD-10-CM

## 2025-01-15 LAB
ALBUMIN SERPL ELPH-MCNC: 4.15 G/DL (ref 3.75–5.21)
ALBUMIN/GLOB SERPL: 1.32 {RATIO} (ref 1–2)
ALPHA1 GLOB SERPL ELPH-MCNC: 0.29 G/DL (ref 0.19–0.46)
ALPHA2 GLOB SERPL ELPH-MCNC: 0.86 G/DL (ref 0.48–1.05)
B-GLOBULIN SERPL ELPH-MCNC: 0.91 G/DL (ref 0.68–1.23)
GAMMA GLOB SERPL ELPH-MCNC: 1.09 G/DL (ref 0.62–1.7)
KAPPA LC FREE SER-MCNC: 2.35 MG/DL (ref 0.33–1.94)
KAPPA LC FREE/LAMBDA FREE SER NEPH: 1.94 {RATIO} (ref 0.26–1.65)
LAMBDA LC FREE SERPL-MCNC: 1.21 MG/DL (ref 0.57–2.63)
PROT SERPL-MCNC: 7.3 G/DL (ref 5.7–8.2)

## 2025-01-21 DIAGNOSIS — R77.8 ABNORMAL SPEP: Primary | ICD-10-CM

## 2025-01-27 ENCOUNTER — HOSPITAL ENCOUNTER (OUTPATIENT)
Dept: RADIATION ONCOLOGY | Facility: HOSPITAL | Age: 80
End: 2025-01-27
Attending: RADIOLOGY
Payer: MEDICARE

## 2025-01-27 ENCOUNTER — NURSE ONLY (OUTPATIENT)
Age: 80
End: 2025-01-27
Attending: OBSTETRICS & GYNECOLOGY
Payer: MEDICARE

## 2025-01-27 VITALS
RESPIRATION RATE: 18 BRPM | TEMPERATURE: 98 F | HEART RATE: 79 BPM | SYSTOLIC BLOOD PRESSURE: 126 MMHG | DIASTOLIC BLOOD PRESSURE: 66 MMHG | BODY MASS INDEX: 22 KG/M2 | WEIGHT: 140.63 LBS | OXYGEN SATURATION: 99 %

## 2025-01-27 DIAGNOSIS — R77.8 ABNORMAL SPEP: ICD-10-CM

## 2025-01-27 DIAGNOSIS — C54.1 ENDOMETRIAL CANCER (HCC): Primary | ICD-10-CM

## 2025-01-27 DIAGNOSIS — B37.31 VAGINAL CANDIDA: ICD-10-CM

## 2025-01-27 LAB
ALBUMIN SERPL-MCNC: 4.2 G/DL (ref 3.2–4.8)
ALBUMIN/GLOB SERPL: 1.2 {RATIO} (ref 1–2)
ALP LIVER SERPL-CCNC: 81 U/L
ALT SERPL-CCNC: 14 U/L
ANION GAP SERPL CALC-SCNC: 4 MMOL/L (ref 0–18)
AST SERPL-CCNC: 17 U/L (ref ?–34)
BASOPHILS # BLD AUTO: 0.04 X10(3) UL (ref 0–0.2)
BASOPHILS NFR BLD AUTO: 1.2 %
BILIRUB SERPL-MCNC: 0.5 MG/DL (ref 0.2–1.1)
BUN BLD-MCNC: 25 MG/DL (ref 9–23)
CALCIUM BLD-MCNC: 9.7 MG/DL (ref 8.7–10.6)
CHLORIDE SERPL-SCNC: 103 MMOL/L (ref 98–112)
CO2 SERPL-SCNC: 31 MMOL/L (ref 21–32)
CREAT BLD-MCNC: 0.7 MG/DL
EGFRCR SERPLBLD CKD-EPI 2021: 89 ML/MIN/1.73M2 (ref 60–?)
EOSINOPHIL # BLD AUTO: 0.21 X10(3) UL (ref 0–0.7)
EOSINOPHIL NFR BLD AUTO: 6.1 %
ERYTHROCYTE [DISTWIDTH] IN BLOOD BY AUTOMATED COUNT: 12.7 %
GLOBULIN PLAS-MCNC: 3.5 G/DL (ref 2–3.5)
GLUCOSE BLD-MCNC: 87 MG/DL (ref 70–99)
HCT VFR BLD AUTO: 38 %
HGB BLD-MCNC: 12.7 G/DL
IMM GRANULOCYTES # BLD AUTO: 0.02 X10(3) UL (ref 0–1)
IMM GRANULOCYTES NFR BLD: 0.6 %
LYMPHOCYTES # BLD AUTO: 0.77 X10(3) UL (ref 1–4)
LYMPHOCYTES NFR BLD AUTO: 22.2 %
MCH RBC QN AUTO: 33.2 PG (ref 26–34)
MCHC RBC AUTO-ENTMCNC: 33.4 G/DL (ref 31–37)
MCV RBC AUTO: 99.5 FL
MONOCYTES # BLD AUTO: 0.65 X10(3) UL (ref 0.1–1)
MONOCYTES NFR BLD AUTO: 18.7 %
NEUTROPHILS # BLD AUTO: 1.78 X10 (3) UL (ref 1.5–7.7)
NEUTROPHILS # BLD AUTO: 1.78 X10(3) UL (ref 1.5–7.7)
NEUTROPHILS NFR BLD AUTO: 51.2 %
OSMOLALITY SERPL CALC.SUM OF ELEC: 290 MOSM/KG (ref 275–295)
PLATELET # BLD AUTO: 254 10(3)UL (ref 150–450)
POTASSIUM SERPL-SCNC: 3.7 MMOL/L (ref 3.5–5.1)
PROT SERPL-MCNC: 7.7 G/DL (ref 5.7–8.2)
RBC # BLD AUTO: 3.82 X10(6)UL
SODIUM SERPL-SCNC: 138 MMOL/L (ref 136–145)
WBC # BLD AUTO: 3.5 X10(3) UL (ref 4–11)

## 2025-01-27 PROCEDURE — 99213 OFFICE O/P EST LOW 20 MIN: CPT

## 2025-01-27 RX ORDER — FLUCONAZOLE 150 MG/1
TABLET ORAL
Qty: 2 TABLET | Refills: 0 | Status: SHIPPED | OUTPATIENT
Start: 2025-01-27

## 2025-01-27 NOTE — PROGRESS NOTES
[facility]  RADIATION ONCOLOGY   FOLLOW UP     Jesica Live  4/23/1945    DIAGNOSIS: Recurrent endometrial cancer      CANCER HISTORY   Hx of R breast cancer s/p surgery and right breast RT in 2010. Then developed vaginal bleeding and underwent TH BSO with SN Bx in 3/2024 at [place]. Stage IA grade 2 endometrial cancer. No LVI. No adjuvant therapy was advised at that time.      Now with 2 small <5 mm mucosal lesions excised with pathology showing recurrent endometrial cancer in 7/2024, just 4 months after surgery. Referred for radiation therapy.    Pelvis   4500 cGy in 25 fractions   6 MV photons   VMAT with IGRT   8/27/2024 to 10/1/2024      Vagina   2250 cGy in 5 fractions   Ir 192   HDR vaginal cylinder   10/14/2024 to 10/28/2024      INTERIM HISTORY   No urinary issues  No bowel issues  Reports 5 weeks of clear vaginal discharge; low volume; persistent, not increasing, but not going away; no vaginal or vulvar pruritus  Saw Dr Gore 11/2024 - normal exam (before onset of vaginal dc)    EXAM     Vitals:    01/27/25 1520   BP: 126/66   Pulse: 79   Resp: 18   Temp: 98.2 °F (36.8 °C)     Speculum exam:  vulva ok, no redness, no lesions  Vagina without mucosal lesions; but +white thick discharge, odorless    IMPRESSION/PLAN   May have candida only vaginal, without itching, just discharge  Has been going 5 weeks    Try Diflucan 150 mg x 2 doses 3 days apart  Message me in 1 week  To see Dr Gore next month anyway with CT     F/u here 1 year    Oh-Grant Gustafson MD  Radiation Oncology    15 minutes were spent with the patient, more than 50 percent on counseling/coordination of care (discuss disease status, management of any side effects, future follow up plans)

## 2025-01-27 NOTE — PROGRESS NOTES
Nursing Follow-Up Note    Patient: Jesica Live  YOB: 1945  Age: 79 year old  Radiation Oncologist: Dr. Brenton Gustafson  Referring Physician: Brenton Gustafson  Chief Complaint:   Chief Complaint   Patient presents with    Follow - Up     Date: 1/27/2025    Toxicities: N/A    Vital Signs: /66 (BP Location: Left arm, Patient Position: Sitting, Cuff Size: adult)   Pulse 79   Temp 98.2 °F (36.8 °C) (Tympanic)   Resp 18   Wt 63.8 kg (140 lb 9.6 oz)   SpO2 99%   BMI 22.36 kg/m² ,   Wt Readings from Last 6 Encounters:   01/27/25 63.8 kg (140 lb 9.6 oz)   01/13/25 63.5 kg (140 lb)   11/20/24 64.4 kg (142 lb)   09/19/24 66.7 kg (147 lb)   09/12/24 66.8 kg (147 lb 3.2 oz)   09/05/24 67.2 kg (148 lb 3.2 oz)       Allergies:  Allergies[1]    Nursing Note: Hx of endometrial cancer. S/p TLH-BSO in March 2024. Local recurrence at vaginal cuff. Completed RT to pelvis and then HDR vag cuff on 10/28/24. Here for follow up today. Saw Dr. Gore 11/20/24. Has CT c/a/p scheduled for 2/10/25 and then f/u with Dr. Gore. Pt reports in the past 5 weeks has had \"leakage/discharge\" from vagina. Unsure if it's urine or vaginal discharge. Does not wear a pad. Is using dilator 2x/week. Denies vaginal bleeding. Only occasional diarrhea. Denies constipation. Denies blood in stool. Denies urinary urgency, frequency, or dysuria.          [1]   Allergies  Allergen Reactions    Aspirin HIVES    Iodine (Topical) HIVES    Pcns [Penicillins] HIVES    Shellfish HIVES

## 2025-01-27 NOTE — PATIENT INSTRUCTIONS
- WE WILL CALL TO SCHEDULE YOUR FOLLOW-UP APPOINTMENT WITH DR. BOATENG IN ONE YEAR    - FOLLOW-UP WITH DR. SINDER AS PLANNED    - CALL [phone] IF YOU HAVE ANY QUESTIONS/CONCERNS REGARDING RADIATION THERAPY

## 2025-01-28 LAB
ALBUMIN SERPL ELPH-MCNC: 4.15 G/DL (ref 3.75–5.21)
ALBUMIN/GLOB SERPL: 1.36 {RATIO} (ref 1–2)
ALPHA1 GLOB SERPL ELPH-MCNC: 0.27 G/DL (ref 0.19–0.46)
ALPHA2 GLOB SERPL ELPH-MCNC: 0.85 G/DL (ref 0.48–1.05)
B-GLOBULIN SERPL ELPH-MCNC: 0.86 G/DL (ref 0.68–1.23)
GAMMA GLOB SERPL ELPH-MCNC: 1.07 G/DL (ref 0.62–1.7)
KAPPA LC FREE SER-MCNC: 2.56 MG/DL (ref 0.33–1.94)
KAPPA LC FREE/LAMBDA FREE SER NEPH: 2.51 {RATIO} (ref 0.26–1.65)
LAMBDA LC FREE SERPL-MCNC: 1.02 MG/DL (ref 0.57–2.63)
PROT SERPL-MCNC: 7.2 G/DL (ref 5.7–8.2)

## 2025-01-29 ENCOUNTER — SOCIAL WORK SERVICES (OUTPATIENT)
Age: 80
End: 2025-01-29

## 2025-01-29 NOTE — PROGRESS NOTES
Pt completed a distress screening with a score of 5 noted. No concerns noted. Pt declined SW but [employer] message sent.    SW sent a [employer] message to provide resources and SW contact information. SW provided cancer support resources including [facility] and [facility]. The packet of resources included information on transportation resources, homecare/home health agencies, Facebook support group page and counseling resources. SW reviewed Advance Directives and importance of completion. SW explained role of SW in Cancer Center and social work contact information.    Vi Dorantes [place] [address]  Ph: [phone] [email or web address]  Fax: [phone]

## 2025-02-03 ENCOUNTER — TELEPHONE (OUTPATIENT)
Age: 80
End: 2025-02-03

## 2025-02-03 RX ORDER — DIPHENHYDRAMINE HCL 25 MG
50 CAPSULE ORAL AS DIRECTED
Qty: 2 CAPSULE | Refills: 0 | Status: SHIPPED | OUTPATIENT
Start: 2025-02-03

## 2025-02-03 RX ORDER — PREDNISONE 50 MG/1
50 TABLET ORAL AS DIRECTED
Qty: 3 TABLET | Refills: 0 | Status: SHIPPED | OUTPATIENT
Start: 2025-02-03

## 2025-02-03 NOTE — TELEPHONE ENCOUNTER
Patient is allergic to contrast. Needs to be premedicated with Prednisone and Benadryl before CT scan. Rx sent to her [facility] pharmacy. Patient will  her contrast tomorrow from the cancer center.

## 2025-02-03 NOTE — TELEPHONE ENCOUNTER
Pt is scheduled 2/10 for CT scan and wants to know will a RX be sent for Prednisone and Benadryl.       Please contact patient

## 2025-02-05 ENCOUNTER — APPOINTMENT (OUTPATIENT)
Dept: RADIATION ONCOLOGY | Facility: HOSPITAL | Age: 80
End: 2025-02-05
Attending: RADIOLOGY
Payer: MEDICARE

## 2025-02-10 ENCOUNTER — HOSPITAL ENCOUNTER (OUTPATIENT)
Dept: CT IMAGING | Facility: HOSPITAL | Age: 80
Discharge: HOME | End: 2025-02-10
Attending: OBSTETRICS & GYNECOLOGY
Payer: MEDICARE

## 2025-02-10 DIAGNOSIS — C54.1 ENDOMETRIAL CANCER (HCC): ICD-10-CM

## 2025-02-10 PROCEDURE — 71260 CT THORAX DX C+: CPT | Performed by: OBSTETRICS & GYNECOLOGY

## 2025-02-10 PROCEDURE — 74177 CT ABD & PELVIS W/CONTRAST: CPT | Performed by: OBSTETRICS & GYNECOLOGY

## 2025-03-19 ENCOUNTER — HOSPITAL ENCOUNTER (OUTPATIENT)
Dept: RADIATION ONCOLOGY | Facility: HOSPITAL | Age: 80
Discharge: HOME | End: 2025-03-19
Attending: RADIOLOGY
Payer: MEDICARE

## 2025-03-19 VITALS
SYSTOLIC BLOOD PRESSURE: 134 MMHG | BODY MASS INDEX: 22.04 KG/M2 | DIASTOLIC BLOOD PRESSURE: 62 MMHG | HEIGHT: 66 IN | RESPIRATION RATE: 20 BRPM | HEART RATE: 87 BPM | TEMPERATURE: 98 F | OXYGEN SATURATION: 96 % | WEIGHT: 137.13 LBS

## 2025-03-19 NOTE — PROGRESS NOTES
Jesica Live returns today s/p robotic assisted TLH BSO and staging for a stage Ia G 2 endometrial cancer 3/2024 (0/0mm, negative LVSI). She presents for  exam.     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     She underwent external radiation to the pelvis followed by high-dose brachytherapy to the vaginal cuff.    Patient was complaining of some thin vaginal discharge this is not a lot she does not we wear a pad.  They attempted some Diflucan without relief.     PHYSICAL EXAMINATION:  /62 (BP Location: Right arm, Patient Position: Sitting, Cuff Size: adult)   Pulse 87   Temp 98 °F (36.7 °C) (Tympanic)   Resp 20   Ht 1.676 m (5' 6\")   Wt 62.2 kg (137 lb 1.6 oz)   SpO2 96%   BMI 22.13 kg/m²     On abdominal exam the incision is healing well. No discharge or errythema. Vaginal cuff is intact.  There is no evidence of any granulation tissue..  There is no evidence of any no pelvic masses or fluid collections.  She does have mild fibrosis at the vaginal cuff     Ct 2/2025 negative     Local recurrence at the vaginal cuff.  Status post radiation with vaginal cuff brachytherapy.  She currently is KEILY.  The small clear vaginal discharge most likely is just from atrophy.  Down the road we could consider using some vaginal estrogen.     PLAN:  Return to clinic in 3 months time      carbon copy: [email or web address]

## 2025-03-19 NOTE — PROGRESS NOTES
Patient is here for 4 month MD follow up for Endometrial cancer. Clear vaginal discharge x 2 months. Tried Diflucan at the end January with no relief. No vaginal bleeding. Denies pain. Last CT scan was in February. Intermittent loose stools, usually once a week. Resolves with Imodium.     Education Record    Learner:  Patient    Disease / Diagnosis:  Endometrial cancer    Barriers / Limitations:  None   Comments:    Method:  Discussion   Comments:    General Topics:  Plan of care reviewed   Comments:    Outcome:  Shows understanding   Comments:

## 2025-07-16 ENCOUNTER — HOSPITAL ENCOUNTER (OUTPATIENT)
Dept: RADIATION ONCOLOGY | Facility: HOSPITAL | Age: 80
Discharge: HOME | End: 2025-07-16
Attending: RADIOLOGY
Payer: MEDICARE

## 2025-07-16 VITALS
DIASTOLIC BLOOD PRESSURE: 76 MMHG | HEART RATE: 82 BPM | WEIGHT: 135.38 LBS | RESPIRATION RATE: 20 BRPM | TEMPERATURE: 98 F | SYSTOLIC BLOOD PRESSURE: 170 MMHG | BODY MASS INDEX: 22 KG/M2 | OXYGEN SATURATION: 98 %

## 2025-07-16 DIAGNOSIS — C54.1 ENDOMETRIAL CANCER (HCC): Primary | ICD-10-CM

## 2025-07-16 RX ORDER — DIPHENHYDRAMINE HCL 25 MG
50 CAPSULE ORAL AS DIRECTED
Qty: 2 CAPSULE | Refills: 0 | Status: SHIPPED | OUTPATIENT
Start: 2025-07-16

## 2025-07-16 RX ORDER — PREDNISONE 50 MG/1
50 TABLET ORAL AS DIRECTED
Qty: 3 TABLET | Refills: 0 | Status: SHIPPED | OUTPATIENT
Start: 2025-07-16

## 2025-07-16 NOTE — PROGRESS NOTES
Jesica Live returns today s/p robotic assisted TLH BSO and staging for a stage Ia G 2 endometrial cancer 3/2024 (0/0mm, negative LVSI). She presents for  exam.     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     She underwent external radiation to the pelvis followed by high-dose brachytherapy to the vaginal cuff.    Patient was complaining of some thin vaginal discharge this is not a lot she does not we wear a pad.  They attempted some Diflucan without relief.     PHYSICAL EXAMINATION:  BP (!) 170/76 (BP Location: Right arm, Patient Position: Sitting, Cuff Size: adult)   Pulse 82   Temp 98.1 °F (36.7 °C) (Tympanic)   Resp 20   Wt 61.4 kg (135 lb 6.4 oz)   SpO2 98%   BMI 21.85 kg/m²     On abdominal exam the incision is healing well. No discharge or errythema. Vaginal cuff is intact.  There is no evidence of any granulation tissue..  There is no evidence of any no pelvic masses or fluid collections.  She does have mild fibrosis at the vaginal cuff     Ct 2/2025 negative     Local recurrence at the vaginal cuff.  Status post radiation with vaginal cuff brachytherapy.  She currently is KEILY.  The small clear vaginal discharge most likely is just from atrophy.  Down the road we could consider using some vaginal estrogen.     PLAN:  Return to clinic in 4 months time     CT     carbon copy: [email or web address]

## 2025-07-16 NOTE — PROGRESS NOTES
Patient is here for 4 month MD follow up for Endometrial cancer. Denies any vaginal bleeding. Occasional clear discharge. Denies pain. Feeling well. No concerns at present time. Last CT scan was in February.     Education Record    Learner:  Patient    Disease / Diagnosis:  Endometrial cancer    Barriers / Limitations:  None   Comments:    Method:  Discussion   Comments:    General Topics:  Plan of care reviewed   Comments:    Outcome:  Shows understanding   Comments:

## 2025-08-18 ENCOUNTER — HOSPITAL ENCOUNTER (OUTPATIENT)
Dept: CT IMAGING | Facility: HOSPITAL | Age: 80
Discharge: HOME | End: 2025-08-18
Attending: OBSTETRICS & GYNECOLOGY

## 2025-08-18 DIAGNOSIS — C54.1 ENDOMETRIAL CANCER (HCC): ICD-10-CM

## 2025-08-18 LAB
CREAT BLD-MCNC: 0.9 MG/DL (ref 0.55–1.02)
EGFRCR SERPLBLD CKD-EPI 2021: 65 ML/MIN/1.73M2 (ref 60–?)

## 2025-08-18 PROCEDURE — 74177 CT ABD & PELVIS W/CONTRAST: CPT | Performed by: OBSTETRICS & GYNECOLOGY

## 2025-08-18 PROCEDURE — 82565 ASSAY OF CREATININE: CPT

## 2025-08-18 PROCEDURE — 71260 CT THORAX DX C+: CPT | Performed by: OBSTETRICS & GYNECOLOGY

## (undated) DEVICE — SET HYSTEROSCOPIC OUTFLO TB DISP FOR FLD MGMT

## (undated) DEVICE — TRAY PREP WET SKIN SPNG STK WNG SPNG ABSRB

## (undated) DEVICE — SEAL HYSTEROSCOPE TRUCLEAR

## (undated) DEVICE — PACK GYNE CUSTOM

## (undated) DEVICE — GLOVE SUR 6 PROTEXIS PI PIP CRM PWD F

## (undated) DEVICE — SOLUTION IRRIG 3000ML 0.9% NACL FLX CONT

## (undated) DEVICE — SET TB INFLO FOR TRUCLEAR SYS HYSTEROLUX

## (undated) DEVICE — SOLUTION IRRIG 1000ML 0.9% NACL USP BTL

## (undated) DEVICE — SLEEVE COMPR M KNEE LEN SGL USE KENDALL SCD

## (undated) DEVICE — MORCELLATOR HYSTEROSCOPIC MINI SFT TISS

## (undated) DEVICE — SOCK SPEC L9IN STD PLAS FR POUR/ACC PRT

## (undated) DEVICE — SUCTION CANISTER 2000CC

## (undated) DEVICE — TUBING SUCT L18IN DIA5MM CLR NONCONDUCTIVE

## (undated) NOTE — LETTER
OUTSIDE TESTING RESULT REQUEST     IMPORTANT: FOR YOUR IMMEDIATE ATTENTION  Please FAX all test results listed below to: [phone]     Testing already done on or about: 2024     * * * * If testing is NOT complete, arrange with patient A.S.A.P. * * * *      Patient Name: Jesica Live  Surgery Date: 2024  Medical Record: [id number]  CSN: [id number]  : 1945 - A: 78 y     Sex: female  Surgeon(s):  Sparkle De La Cruz MD  Procedure: HYSTEROSCOPY DILATION AND CURETTAGE  Anesthesia Type: General     Surgeon: Sparkle De La Cruz MD     The following Testing and Time Line are REQUIRED PER ANESTHESIA     EKG READ AND SIGNED WITHIN   90 days      Thank You,   Sent by: JOHN Reilly